# Patient Record
Sex: FEMALE | Race: WHITE | NOT HISPANIC OR LATINO | Employment: OTHER | URBAN - METROPOLITAN AREA
[De-identification: names, ages, dates, MRNs, and addresses within clinical notes are randomized per-mention and may not be internally consistent; named-entity substitution may affect disease eponyms.]

---

## 2019-02-05 ENCOUNTER — HOSPITAL ENCOUNTER (OUTPATIENT)
Dept: LAB | Facility: HOSPITAL | Age: 84
Discharge: HOME | End: 2019-02-05
Attending: INTERNAL MEDICINE

## 2019-02-05 LAB
APPEARANCE UR: ABNORMAL
BILIRUB UR QL: NEGATIVE
COLOR UR: YELLOW
CONV BACTERIA: ABNORMAL
CONV COLLECTION SOURCE (UA): ABNORMAL
CONV HYALINE CASTS IN URINE MICRO: ABNORMAL /[LPF]
CONV UROBILINOGEN IN URINE BY AUTOMATED TEST STRIP: 0.2 {EHRLICHU}/DL (ref 0.1–1)
GLUCOSE UR QL: NEGATIVE MG/DL
HGB UR QL STRIP: ABNORMAL
KETONES UR QL STRIP: NEGATIVE MG/DL
LEUKOCYTE ESTERASE UR QL STRIP: ABNORMAL
NITRITE UR QL STRIP: NEGATIVE
PH UR STRIP.AUTO: 5.5 [PH] (ref 5–8)
PROT UR QL: NEGATIVE MG/DL
RBC #/AREA URNS HPF: ABNORMAL /[HPF]
SP GR UR: 1.02 (ref 1–1.03)
SQUAMOUS SPT QL MICRO: ABNORMAL /[HPF]
WBC #/AREA URNS HPF: ABNORMAL /[HPF]

## 2019-02-07 LAB — BACTERIA UR CULT: NORMAL

## 2019-02-08 ENCOUNTER — HOSPITAL ENCOUNTER (OUTPATIENT)
Dept: OTHER | Facility: HOSPITAL | Age: 84
Discharge: HOME | End: 2019-02-08
Attending: INTERNAL MEDICINE

## 2019-04-24 ENCOUNTER — HOSPITAL ENCOUNTER (OUTPATIENT)
Dept: CARDIOLOGY | Facility: HOSPITAL | Age: 84
Discharge: HOME | End: 2019-04-24

## 2019-05-28 ENCOUNTER — HOSPITAL ENCOUNTER (OUTPATIENT)
Dept: CARDIOLOGY | Facility: HOSPITAL | Age: 84
Discharge: HOME | End: 2019-05-28

## 2019-06-04 ENCOUNTER — HOSPITAL ENCOUNTER (OUTPATIENT)
Dept: LAB | Facility: HOSPITAL | Age: 84
Discharge: HOME | End: 2019-06-04
Attending: INTERNAL MEDICINE

## 2019-06-04 LAB
ALBUMIN SERPL-MCNC: 4 G/DL (ref 3.5–5)
ALBUMIN/GLOB SERPL: 1.3 {RATIO} (ref 1.4–2.6)
ALP SERPL-CCNC: 76 U/L (ref 43–160)
ALT SERPL-CCNC: 13 U/L (ref 10–40)
ANION GAP SERPL CALC-SCNC: 17 MMOL/L (ref 8–19)
AST SERPL-CCNC: 22 U/L (ref 15–50)
BASOPHILS # BLD AUTO: 0.07 10*3/UL (ref 0–0.2)
BASOPHILS NFR BLD AUTO: 0.8 % (ref 0–3)
BILIRUB SERPL-MCNC: 0.45 MG/DL (ref 0.2–1.3)
BUN SERPL-MCNC: 21 MG/DL (ref 5–25)
BUN/CREAT SERPL: 23 {RATIO} (ref 6–20)
CALCIUM SERPL-MCNC: 9.8 MG/DL (ref 8.7–10.4)
CHLORIDE SERPL-SCNC: 99 MMOL/L (ref 99–111)
CHOLEST SERPL-MCNC: 215 MG/DL (ref 107–200)
CHOLEST/HDLC SERPL: 3.8 {RATIO} (ref 3–6)
CONV ABS IMM GRAN: 0.06 10*3/UL (ref 0–0.2)
CONV CO2: 28 MMOL/L (ref 22–32)
CONV IMMATURE GRAN: 0.7 % (ref 0–1.8)
CONV TOTAL PROTEIN: 7.1 G/DL (ref 6.3–8.2)
CREAT UR-MCNC: 0.9 MG/DL (ref 0.5–0.9)
DEPRECATED RDW RBC AUTO: 45.1 FL (ref 36.4–46.3)
EOSINOPHIL # BLD AUTO: 0.18 10*3/UL (ref 0–0.7)
EOSINOPHIL # BLD AUTO: 2.1 % (ref 0–7)
ERYTHROCYTE [DISTWIDTH] IN BLOOD BY AUTOMATED COUNT: 13.1 % (ref 11.7–14.4)
EST. AVERAGE GLUCOSE BLD GHB EST-MCNC: 128 MG/DL
GFR SERPLBLD BASED ON 1.73 SQ M-ARVRAT: 58 ML/MIN/{1.73_M2}
GLOBULIN UR ELPH-MCNC: 3.1 G/DL (ref 2–3.5)
GLUCOSE SERPL-MCNC: 120 MG/DL (ref 65–99)
HBA1C MFR BLD: 14.3 G/DL (ref 12–16)
HBA1C MFR BLD: 6.1 % (ref 3.5–5.7)
HCT VFR BLD AUTO: 44.4 % (ref 37–47)
HDLC SERPL-MCNC: 57 MG/DL (ref 40–60)
LDLC SERPL CALC-MCNC: 142 MG/DL (ref 70–100)
LYMPHOCYTES # BLD AUTO: 2.58 10*3/UL (ref 1–5)
MCH RBC QN AUTO: 30.1 PG (ref 27–31)
MCHC RBC AUTO-ENTMCNC: 32.2 G/DL (ref 33–37)
MCV RBC AUTO: 93.5 FL (ref 81–99)
MONOCYTES # BLD AUTO: 0.7 10*3/UL (ref 0.2–1.2)
MONOCYTES NFR BLD AUTO: 8.2 % (ref 3–10)
NEUTROPHILS # BLD AUTO: 4.97 10*3/UL (ref 2–8)
NEUTROPHILS NFR BLD AUTO: 58.1 % (ref 30–85)
NRBC CBCN: 0 % (ref 0–0.7)
OSMOLALITY SERPL CALC.SUM OF ELEC: 294 MOSM/KG (ref 273–304)
PLATELET # BLD AUTO: 277 10*3/UL (ref 130–400)
PMV BLD AUTO: 10.9 FL (ref 9.4–12.3)
POTASSIUM SERPL-SCNC: 3.9 MMOL/L (ref 3.5–5.3)
RBC # BLD AUTO: 4.75 10*6/UL (ref 4.2–5.4)
SODIUM SERPL-SCNC: 140 MMOL/L (ref 135–147)
T4 FREE SERPL-MCNC: 1.6 NG/DL (ref 0.9–1.8)
TRIGL SERPL-MCNC: 82 MG/DL (ref 40–150)
TSH SERPL-ACNC: 2.95 M[IU]/L (ref 0.27–4.2)
VARIANT LYMPHS NFR BLD MANUAL: 30.1 % (ref 20–45)
VLDLC SERPL-MCNC: 16 MG/DL (ref 5–37)
WBC # BLD AUTO: 8.56 10*3/UL (ref 4.8–10.8)

## 2019-08-12 ENCOUNTER — HOSPITAL ENCOUNTER (OUTPATIENT)
Dept: OTHER | Facility: HOSPITAL | Age: 84
Discharge: HOME | End: 2019-08-12
Attending: PHYSICIAN ASSISTANT

## 2019-08-12 LAB
T4 FREE SERPL-MCNC: 1.4 NG/DL (ref 0.9–1.8)
TSH SERPL-ACNC: 2.04 M[IU]/L (ref 0.27–4.2)

## 2019-08-21 ENCOUNTER — HOSPITAL ENCOUNTER (OUTPATIENT)
Dept: GENERAL RADIOLOGY | Facility: HOSPITAL | Age: 84
Discharge: HOME | End: 2019-08-21
Attending: PHYSICIAN ASSISTANT

## 2019-09-24 ENCOUNTER — HOSPITAL ENCOUNTER (OUTPATIENT)
Dept: CARDIOLOGY | Facility: HOSPITAL | Age: 84
Discharge: HOME | End: 2019-09-24

## 2019-11-05 ENCOUNTER — HOSPITAL ENCOUNTER (OUTPATIENT)
Dept: OTHER | Facility: HOSPITAL | Age: 84
Discharge: HOME | End: 2019-11-05
Attending: INTERNAL MEDICINE

## 2019-11-05 LAB
ALBUMIN SERPL-MCNC: 4.1 G/DL (ref 3.5–5)
ALBUMIN/GLOB SERPL: 1.3 {RATIO} (ref 1.4–2.6)
ALP SERPL-CCNC: 76 U/L (ref 43–160)
ALT SERPL-CCNC: 12 U/L (ref 10–40)
ANION GAP SERPL CALC-SCNC: 18 MMOL/L (ref 8–19)
AST SERPL-CCNC: 27 U/L (ref 15–50)
BASOPHILS # BLD AUTO: 0.08 10*3/UL (ref 0–0.2)
BASOPHILS NFR BLD AUTO: 0.7 % (ref 0–3)
BILIRUB SERPL-MCNC: 0.44 MG/DL (ref 0.2–1.3)
BUN SERPL-MCNC: 21 MG/DL (ref 5–25)
BUN/CREAT SERPL: 23 {RATIO} (ref 6–20)
CALCIUM SERPL-MCNC: 9.8 MG/DL (ref 8.7–10.4)
CHLORIDE SERPL-SCNC: 100 MMOL/L (ref 99–111)
CONV ABS IMM GRAN: 0.05 10*3/UL (ref 0–0.2)
CONV CO2: 26 MMOL/L (ref 22–32)
CONV IMMATURE GRAN: 0.5 % (ref 0–1.8)
CONV TOTAL PROTEIN: 7.2 G/DL (ref 6.3–8.2)
CREAT UR-MCNC: 0.93 MG/DL (ref 0.5–0.9)
DEPRECATED RDW RBC AUTO: 47.3 FL (ref 36.4–46.3)
EOSINOPHIL # BLD AUTO: 0.2 10*3/UL (ref 0–0.7)
EOSINOPHIL # BLD AUTO: 1.9 % (ref 0–7)
ERYTHROCYTE [DISTWIDTH] IN BLOOD BY AUTOMATED COUNT: 13.3 % (ref 11.7–14.4)
EST. AVERAGE GLUCOSE BLD GHB EST-MCNC: 134 MG/DL
GFR SERPLBLD BASED ON 1.73 SQ M-ARVRAT: 55 ML/MIN/{1.73_M2}
GLOBULIN UR ELPH-MCNC: 3.1 G/DL (ref 2–3.5)
GLUCOSE SERPL-MCNC: 119 MG/DL (ref 65–99)
HBA1C MFR BLD: 6.3 % (ref 3.5–5.7)
HCT VFR BLD AUTO: 45.4 % (ref 37–47)
HGB BLD-MCNC: 14.2 G/DL (ref 12–16)
LYMPHOCYTES # BLD AUTO: 2.62 10*3/UL (ref 1–5)
LYMPHOCYTES NFR BLD AUTO: 24.5 % (ref 20–45)
MCH RBC QN AUTO: 30 PG (ref 27–31)
MCHC RBC AUTO-ENTMCNC: 31.3 G/DL (ref 33–37)
MCV RBC AUTO: 96 FL (ref 81–99)
MONOCYTES # BLD AUTO: 0.72 10*3/UL (ref 0.2–1.2)
MONOCYTES NFR BLD AUTO: 6.7 % (ref 3–10)
NEUTROPHILS # BLD AUTO: 7.03 10*3/UL (ref 2–8)
NEUTROPHILS NFR BLD AUTO: 65.7 % (ref 30–85)
NRBC CBCN: 0 % (ref 0–0.7)
OSMOLALITY SERPL CALC.SUM OF ELEC: 294 MOSM/KG (ref 273–304)
PLATELET # BLD AUTO: 265 10*3/UL (ref 130–400)
PMV BLD AUTO: 11.1 FL (ref 9.4–12.3)
POTASSIUM SERPL-SCNC: 3.5 MMOL/L (ref 3.5–5.3)
RBC # BLD AUTO: 4.73 10*6/UL (ref 4.2–5.4)
SODIUM SERPL-SCNC: 140 MMOL/L (ref 135–147)
TSH SERPL-ACNC: 2.45 M[IU]/L (ref 0.27–4.2)
WBC # BLD AUTO: 10.7 10*3/UL (ref 4.8–10.8)

## 2020-02-23 ENCOUNTER — HOSPITAL ENCOUNTER (OUTPATIENT)
Dept: URGENT CARE | Facility: CLINIC | Age: 85
Discharge: HOME | End: 2020-02-23
Attending: FAMILY MEDICINE

## 2020-05-06 ENCOUNTER — HOSPITAL ENCOUNTER (OUTPATIENT)
Dept: LAB | Facility: HOSPITAL | Age: 85
Discharge: HOME | End: 2020-05-06
Attending: INTERNAL MEDICINE

## 2020-05-06 LAB
ALBUMIN SERPL-MCNC: 4.1 G/DL (ref 3.5–5)
ALBUMIN/GLOB SERPL: 1.4 {RATIO} (ref 1.4–2.6)
ALP SERPL-CCNC: 71 U/L (ref 43–160)
ALT SERPL-CCNC: 9 U/L (ref 10–40)
ANION GAP SERPL CALC-SCNC: 19 MMOL/L (ref 8–19)
AST SERPL-CCNC: 21 U/L (ref 15–50)
BASOPHILS # BLD AUTO: 0.08 10*3/UL (ref 0–0.2)
BASOPHILS NFR BLD AUTO: 0.9 % (ref 0–3)
BILIRUB SERPL-MCNC: 0.41 MG/DL (ref 0.2–1.3)
BUN SERPL-MCNC: 18 MG/DL (ref 5–25)
BUN/CREAT SERPL: 20 {RATIO} (ref 6–20)
CALCIUM SERPL-MCNC: 10.1 MG/DL (ref 8.7–10.4)
CHLORIDE SERPL-SCNC: 101 MMOL/L (ref 99–111)
CONV ABS IMM GRAN: 0.04 10*3/UL (ref 0–0.2)
CONV CO2: 26 MMOL/L (ref 22–32)
CONV IMMATURE GRAN: 0.4 % (ref 0–1.8)
CONV TOTAL PROTEIN: 7.1 G/DL (ref 6.3–8.2)
CREAT UR-MCNC: 0.9 MG/DL (ref 0.5–0.9)
DEPRECATED RDW RBC AUTO: 45.3 FL (ref 36.4–46.3)
EOSINOPHIL # BLD AUTO: 0.18 10*3/UL (ref 0–0.7)
EOSINOPHIL # BLD AUTO: 2 % (ref 0–7)
ERYTHROCYTE [DISTWIDTH] IN BLOOD BY AUTOMATED COUNT: 13 % (ref 11.7–14.4)
GFR SERPLBLD BASED ON 1.73 SQ M-ARVRAT: 57 ML/MIN/{1.73_M2}
GLOBULIN UR ELPH-MCNC: 3 G/DL (ref 2–3.5)
GLUCOSE SERPL-MCNC: 124 MG/DL (ref 65–99)
HCT VFR BLD AUTO: 44.5 % (ref 37–47)
HGB BLD-MCNC: 14 G/DL (ref 12–16)
LYMPHOCYTES # BLD AUTO: 2.93 10*3/UL (ref 1–5)
LYMPHOCYTES NFR BLD AUTO: 32.5 % (ref 20–45)
MCH RBC QN AUTO: 29.7 PG (ref 27–31)
MCHC RBC AUTO-ENTMCNC: 31.5 G/DL (ref 33–37)
MCV RBC AUTO: 94.5 FL (ref 81–99)
MONOCYTES # BLD AUTO: 0.68 10*3/UL (ref 0.2–1.2)
MONOCYTES NFR BLD AUTO: 7.5 % (ref 3–10)
NEUTROPHILS # BLD AUTO: 5.11 10*3/UL (ref 2–8)
NEUTROPHILS NFR BLD AUTO: 56.7 % (ref 30–85)
NRBC CBCN: 0 % (ref 0–0.7)
OSMOLALITY SERPL CALC.SUM OF ELEC: 297 MOSM/KG (ref 273–304)
PLATELET # BLD AUTO: 248 10*3/UL (ref 130–400)
PMV BLD AUTO: 11.4 FL (ref 9.4–12.3)
POTASSIUM SERPL-SCNC: 3.9 MMOL/L (ref 3.5–5.3)
RBC # BLD AUTO: 4.71 10*6/UL (ref 4.2–5.4)
SODIUM SERPL-SCNC: 142 MMOL/L (ref 135–147)
TSH SERPL-ACNC: 2.47 M[IU]/L (ref 0.27–4.2)
WBC # BLD AUTO: 9.02 10*3/UL (ref 4.8–10.8)

## 2020-05-13 ENCOUNTER — HOSPITAL ENCOUNTER (OUTPATIENT)
Dept: GENERAL RADIOLOGY | Facility: HOSPITAL | Age: 85
Discharge: HOME | End: 2020-05-13
Attending: INTERNAL MEDICINE

## 2020-05-19 ENCOUNTER — OFFICE VISIT CONVERTED (OUTPATIENT)
Dept: ORTHOPEDIC SURGERY | Facility: CLINIC | Age: 85
End: 2020-05-19
Attending: ORTHOPAEDIC SURGERY

## 2020-08-11 ENCOUNTER — HOSPITAL ENCOUNTER (OUTPATIENT)
Dept: OTHER | Facility: HOSPITAL | Age: 85
Discharge: HOME | End: 2020-08-11
Attending: PHYSICIAN ASSISTANT

## 2020-08-11 LAB
T4 FREE SERPL-MCNC: 1.3 NG/DL (ref 0.9–1.8)
TSH SERPL-ACNC: 3.41 M[IU]/L (ref 0.27–4.2)

## 2020-09-09 ENCOUNTER — HOSPITAL ENCOUNTER (OUTPATIENT)
Dept: OTHER | Facility: HOSPITAL | Age: 85
Discharge: HOME | End: 2020-09-09
Attending: INTERNAL MEDICINE

## 2020-09-09 LAB
25(OH)D3 SERPL-MCNC: 93.8 NG/ML (ref 30–100)
ALBUMIN SERPL-MCNC: 3.9 G/DL (ref 3.5–5)
ALBUMIN/GLOB SERPL: 1.4 {RATIO} (ref 1.4–2.6)
ALP SERPL-CCNC: 69 U/L (ref 43–160)
ALT SERPL-CCNC: 11 U/L (ref 10–40)
ANION GAP SERPL CALC-SCNC: 17 MMOL/L (ref 8–19)
AST SERPL-CCNC: 24 U/L (ref 15–50)
BASOPHILS # BLD AUTO: 0.09 10*3/UL (ref 0–0.2)
BASOPHILS NFR BLD AUTO: 1 % (ref 0–3)
BILIRUB SERPL-MCNC: 0.49 MG/DL (ref 0.2–1.3)
BUN SERPL-MCNC: 23 MG/DL (ref 5–25)
BUN/CREAT SERPL: 27 {RATIO} (ref 6–20)
CALCIUM SERPL-MCNC: 9.8 MG/DL (ref 8.7–10.4)
CHLORIDE SERPL-SCNC: 102 MMOL/L (ref 99–111)
CONV ABS IMM GRAN: 0.05 10*3/UL (ref 0–0.2)
CONV CO2: 27 MMOL/L (ref 22–32)
CONV IMMATURE GRAN: 0.6 % (ref 0–1.8)
CONV TOTAL PROTEIN: 6.7 G/DL (ref 6.3–8.2)
CREAT UR-MCNC: 0.86 MG/DL (ref 0.5–0.9)
DEPRECATED RDW RBC AUTO: 46.2 FL (ref 36.4–46.3)
EOSINOPHIL # BLD AUTO: 0.22 10*3/UL (ref 0–0.7)
EOSINOPHIL # BLD AUTO: 2.5 % (ref 0–7)
ERYTHROCYTE [DISTWIDTH] IN BLOOD BY AUTOMATED COUNT: 13 % (ref 11.7–14.4)
EST. AVERAGE GLUCOSE BLD GHB EST-MCNC: 123 MG/DL
FOLATE SERPL-MCNC: >20 NG/ML (ref 4.8–20)
GFR SERPLBLD BASED ON 1.73 SQ M-ARVRAT: >60 ML/MIN/{1.73_M2}
GLOBULIN UR ELPH-MCNC: 2.8 G/DL (ref 2–3.5)
GLUCOSE SERPL-MCNC: 122 MG/DL (ref 65–99)
HBA1C MFR BLD: 5.9 % (ref 3.5–5.7)
HCT VFR BLD AUTO: 45.4 % (ref 37–47)
HGB BLD-MCNC: 14.2 G/DL (ref 12–16)
LYMPHOCYTES # BLD AUTO: 2.82 10*3/UL (ref 1–5)
LYMPHOCYTES NFR BLD AUTO: 31.8 % (ref 20–45)
MCH RBC QN AUTO: 30.3 PG (ref 27–31)
MCHC RBC AUTO-ENTMCNC: 31.3 G/DL (ref 33–37)
MCV RBC AUTO: 96.8 FL (ref 81–99)
MONOCYTES # BLD AUTO: 0.64 10*3/UL (ref 0.2–1.2)
MONOCYTES NFR BLD AUTO: 7.2 % (ref 3–10)
NEUTROPHILS # BLD AUTO: 5.04 10*3/UL (ref 2–8)
NEUTROPHILS NFR BLD AUTO: 56.9 % (ref 30–85)
NRBC CBCN: 0 % (ref 0–0.7)
OSMOLALITY SERPL CALC.SUM OF ELEC: 299 MOSM/KG (ref 273–304)
PLATELET # BLD AUTO: 252 10*3/UL (ref 130–400)
PMV BLD AUTO: 10.9 FL (ref 9.4–12.3)
POTASSIUM SERPL-SCNC: 3.9 MMOL/L (ref 3.5–5.3)
RBC # BLD AUTO: 4.69 10*6/UL (ref 4.2–5.4)
SODIUM SERPL-SCNC: 142 MMOL/L (ref 135–147)
T4 FREE SERPL-MCNC: 1.4 NG/DL (ref 0.9–1.8)
TSH SERPL-ACNC: 2.42 M[IU]/L (ref 0.27–4.2)
VIT B12 SERPL-MCNC: 639 PG/ML (ref 211–911)
WBC # BLD AUTO: 8.86 10*3/UL (ref 4.8–10.8)

## 2020-09-18 ENCOUNTER — HOSPITAL ENCOUNTER (OUTPATIENT)
Dept: OTHER | Facility: HOSPITAL | Age: 85
Discharge: HOME | End: 2020-09-18
Attending: INTERNAL MEDICINE

## 2020-09-29 ENCOUNTER — HOSPITAL ENCOUNTER (OUTPATIENT)
Dept: OTHER | Facility: HOSPITAL | Age: 85
Discharge: HOME | End: 2020-09-29
Attending: PHYSICIAN ASSISTANT

## 2020-09-29 LAB
T4 FREE SERPL-MCNC: 1.2 NG/DL (ref 0.9–1.8)
TSH SERPL-ACNC: 2.13 M[IU]/L (ref 0.27–4.2)

## 2020-10-20 ENCOUNTER — HOSPITAL ENCOUNTER (OUTPATIENT)
Dept: CARDIOLOGY | Facility: HOSPITAL | Age: 85
Discharge: HOME | End: 2020-10-20
Attending: SURGERY

## 2021-02-26 ENCOUNTER — HOSPITAL ENCOUNTER (OUTPATIENT)
Dept: VACCINE CLINIC | Facility: HOSPITAL | Age: 86
Discharge: HOME | End: 2021-02-26
Attending: INTERNAL MEDICINE

## 2021-03-11 ENCOUNTER — HOSPITAL ENCOUNTER (OUTPATIENT)
Dept: OTHER | Facility: HOSPITAL | Age: 86
Discharge: HOME | End: 2021-03-11
Attending: INTERNAL MEDICINE

## 2021-03-11 LAB
ALBUMIN SERPL-MCNC: 3.7 G/DL (ref 3.5–5)
ALBUMIN/GLOB SERPL: 1 {RATIO} (ref 1.4–2.6)
ALP SERPL-CCNC: 86 U/L (ref 43–160)
ALT SERPL-CCNC: 16 U/L (ref 10–40)
ANION GAP SERPL CALC-SCNC: 16 MMOL/L (ref 8–19)
AST SERPL-CCNC: 25 U/L (ref 15–50)
BASOPHILS # BLD AUTO: 0.08 10*3/UL (ref 0–0.2)
BASOPHILS NFR BLD AUTO: 0.7 % (ref 0–3)
BILIRUB SERPL-MCNC: 0.44 MG/DL (ref 0.2–1.3)
BUN SERPL-MCNC: 17 MG/DL (ref 5–25)
BUN/CREAT SERPL: 17 {RATIO} (ref 6–20)
CALCIUM SERPL-MCNC: 9.9 MG/DL (ref 8.7–10.4)
CHLORIDE SERPL-SCNC: 100 MMOL/L (ref 99–111)
CONV ABS IMM GRAN: 0.16 10*3/UL (ref 0–0.2)
CONV CO2: 27 MMOL/L (ref 22–32)
CONV CREATININE URINE, RANDOM: 66.1 MG/DL (ref 10–300)
CONV IMMATURE GRAN: 1.3 % (ref 0–1.8)
CONV MICROALBUM.,U,RANDOM: 12.1 MG/L (ref 0–20)
CONV TOTAL PROTEIN: 7.4 G/DL (ref 6.3–8.2)
CREAT UR-MCNC: 1.02 MG/DL (ref 0.5–0.9)
DEPRECATED RDW RBC AUTO: 44.9 FL (ref 36.4–46.3)
EOSINOPHIL # BLD AUTO: 0.21 10*3/UL (ref 0–0.7)
EOSINOPHIL # BLD AUTO: 1.8 % (ref 0–7)
ERYTHROCYTE [DISTWIDTH] IN BLOOD BY AUTOMATED COUNT: 12.7 % (ref 11.7–14.4)
EST. AVERAGE GLUCOSE BLD GHB EST-MCNC: 134 MG/DL
GFR SERPLBLD BASED ON 1.73 SQ M-ARVRAT: 49 ML/MIN/{1.73_M2}
GLOBULIN UR ELPH-MCNC: 3.7 G/DL (ref 2–3.5)
GLUCOSE SERPL-MCNC: 116 MG/DL (ref 65–99)
HBA1C MFR BLD: 6.3 % (ref 3.5–5.7)
HCT VFR BLD AUTO: 44.1 % (ref 37–47)
HGB BLD-MCNC: 13.8 G/DL (ref 12–16)
LYMPHOCYTES # BLD AUTO: 2.97 10*3/UL (ref 1–5)
LYMPHOCYTES NFR BLD AUTO: 24.8 % (ref 20–45)
MCH RBC QN AUTO: 29.9 PG (ref 27–31)
MCHC RBC AUTO-ENTMCNC: 31.3 G/DL (ref 33–37)
MCV RBC AUTO: 95.7 FL (ref 81–99)
MICROALBUMIN/CREAT UR: 18.3 MG/G{CRE} (ref 0–35)
MONOCYTES # BLD AUTO: 0.95 10*3/UL (ref 0.2–1.2)
MONOCYTES NFR BLD AUTO: 7.9 % (ref 3–10)
NEUTROPHILS # BLD AUTO: 7.61 10*3/UL (ref 2–8)
NEUTROPHILS NFR BLD AUTO: 63.5 % (ref 30–85)
NRBC CBCN: 0 % (ref 0–0.7)
OSMOLALITY SERPL CALC.SUM OF ELEC: 291 MOSM/KG (ref 273–304)
PLATELET # BLD AUTO: 341 10*3/UL (ref 130–400)
PMV BLD AUTO: 10.4 FL (ref 9.4–12.3)
POTASSIUM SERPL-SCNC: 3.9 MMOL/L (ref 3.5–5.3)
RBC # BLD AUTO: 4.61 10*6/UL (ref 4.2–5.4)
SODIUM SERPL-SCNC: 139 MMOL/L (ref 135–147)
WBC # BLD AUTO: 11.98 10*3/UL (ref 4.8–10.8)

## 2021-03-26 ENCOUNTER — HOSPITAL ENCOUNTER (OUTPATIENT)
Dept: VACCINE CLINIC | Facility: HOSPITAL | Age: 86
Discharge: HOME | End: 2021-03-26
Attending: INTERNAL MEDICINE

## 2021-04-01 ENCOUNTER — HOSPITAL ENCOUNTER (OUTPATIENT)
Dept: CARDIOLOGY | Facility: HOSPITAL | Age: 86
Discharge: HOME | End: 2021-04-01
Attending: SURGERY

## 2021-05-10 NOTE — H&P
"   History and Physical      Patient Name: Sugar Lee   Patient ID: [id number]   Sex: Female   YOB: 1932        Visit Date: May 19, 2020    Provider: Jorje Boyer MD   Location: [facility]   Location Address: [address] [place] [address]  [id number]   Location Phone: [phone]          History Of Present Illness  Sugar Lee is a 87 year old female who presents today to [facility]. Patient presents for evaluation of left wrist pain/left thumb pain. Patient states her left wrist/thumb have been causing her pain for the past two months. Patient points to pain in the base of her thumb and radial wrist. Patient has been using arthritis cream/CBD oil with some relief. Patient denies numbness/tingling. Patient PCP ordered xrays and patient has been using brace she has at home.       Allergy List  SULFA (SULFONAMIDES)       Allergies Reconciled  Social History  Tobacco         Review of Systems  · Constitutional  o Denies  o : fever, chills, weight loss  · Cardiovascular  o Denies  o : chest pain, shortness of breath  · Gastrointestinal  o Denies  o : liver disease, heartburn, nausea, blood in stools  · Genitourinary  o Denies  o : painful urination, blood in urine  · Integument  o Denies  o : rash, itching  · Neurologic  o Denies  o : headache, weakness, loss of consciousness  · Musculoskeletal  o Denies  o : painful, swollen joints  · Psychiatric  o Denies  o : drug/alcohol addiction, anxiety, depression      Vitals  Date Time BP Position Site L\R Cuff Size HR RR TEMP (F) WT  HT  BMI kg/m2 BSA m2 O2 Sat        05/19/2020 01:47 PM          5'  4\"             Physical Examination  · Constitutional  o Appearance  o : well developed, well-nourished, no obvious deformities present  · Head and Face  o Head  o :   § Inspection  § : normocephalic  o Face  o :   § Inspection  § : no facial lesions  · Eyes  o Conjunctivae  o : conjunctivae normal  o Sclerae  o : sclerae " white  · Ears, Nose, Mouth and Throat  o Ears  o :   § External Ears  § : appearance within normal limits  § Hearing  § : intact  o Nose  o :   § External Nose  § : appearance normal  · Neck  o Inspection/Palpation  o : normal appearance  o Range of Motion  o : full range of motion  · Respiratory  o Respiratory Effort  o : breathing unlabored  o Inspection of Chest  o : normal appearance  o Auscultation of Lungs  o : no audible wheezing or rales  · Cardiovascular  o Heart  o : regular rate  · Gastrointestinal  o Abdominal Examination  o : soft and non-tender  · Skin and Subcutaneous Tissue  o General Inspection  o : intact, no rashes  · Psychiatric  o General  o : Alert and oriented x3  o Judgement and Insight  o : judgment and insight intact  o Mood and Affect  o : mood normal, affect appropriate  · Left Wrist  o Inspection  o : Tender to palpation of radial wrist, full range of motion. Neurovascularly intact.   · Left Hand  o Inspection  o : capillary refill less than 5 seconds in all five nailbeds, skin intact, no redness, no swelling, no ecchymosis. Tender to palpation at base of thumb, positive grind test. Full range of motion with pain in the thumb.   · Injection Note/Aspiration Note  o Site  o : left wrist  o Procedure  o : Procedure: After educating the patient, patient gave consent for procedure. After using Chloraprep, the joint space was injected. The patient tolerated the procedure well.  o Medication  o : 80 mg of DepoMedrol with 1cc of 1% Lidocaine  · Imaging  o Imaging  o : Xray left wrist, 5/13/20, CONCLUSION: 1. Questionable subacute fracture of the triquetrum bone.           Assessment  · Left wrist pain     719.43/M25.532  · Left Thumb CMC arthritis     716.94/M19.049      Plan  · Orders  o Depo-Medrol injection 80mg () - - 05/19/2020   Lot [id number] Exp 05 2021 [employer] Administered by GILDARDO CASTELLANOS MD  o Arthrocentesis of wrist (20605) - - 05/19/2020   Lot [id number] Exp 07  01 2021 [employer] Administered by GILDARDO CASTELLANOS MD  · Medications  o Medications have been Reconciled  o Transition of Care or Provider Policy  · Instructions  o Reviewed the patient's Past Medical, Social, and Family history as well as the ROS at today's visit, no changes.  o Call or return if worsening symptoms.  o Follow up in 6-8 weeks.  o Reviewed xrays with patient, advised her and her daughter of diagnosis and treatment plan including steroid injection today of the left CMC joint and she tolerated it well. Thumb brace given. Follow up in 6-8 weeks.             Electronically Signed by: RENEE Trevino-MISTY -Author on May 19, 2020 02:26:23 PM  Electronically Co-signed by: Gildardo Castellanos MD -Reviewer on May 19, 2020 08:57:14 PM

## 2021-05-15 VITALS — HEIGHT: 64 IN

## 2021-07-06 PROCEDURE — U0005 INFEC AGEN DETEC AMPLI PROBE: HCPCS | Performed by: FAMILY MEDICINE

## 2021-07-06 PROCEDURE — U0003 INFECTIOUS AGENT DETECTION BY NUCLEIC ACID (DNA OR RNA); SEVERE ACUTE RESPIRATORY SYNDROME CORONAVIRUS 2 (SARS-COV-2) (CORONAVIRUS DISEASE [COVID-19]), AMPLIFIED PROBE TECHNIQUE, MAKING USE OF HIGH THROUGHPUT TECHNOLOGIES AS DESCRIBED BY CMS-2020-01-R: HCPCS | Performed by: FAMILY MEDICINE

## 2021-07-21 RX ORDER — SPIRONOLACTONE AND HYDROCHLOROTHIAZIDE 25; 25 MG/1; MG/1
TABLET ORAL
Qty: 45 TABLET | Refills: 4 | Status: SHIPPED | OUTPATIENT
Start: 2021-07-21 | End: 2022-07-26

## 2021-10-01 ENCOUNTER — TRANSCRIBE ORDERS (OUTPATIENT)
Dept: ADMINISTRATIVE | Facility: HOSPITAL | Age: 86
End: 2021-10-01

## 2021-10-01 ENCOUNTER — LAB (OUTPATIENT)
Dept: LAB | Facility: HOSPITAL | Age: 86
End: 2021-10-01

## 2021-10-01 DIAGNOSIS — E89.0 HYPOTHYROIDISM, POSTSURGICAL: ICD-10-CM

## 2021-10-01 DIAGNOSIS — E89.0 HYPOTHYROIDISM, POSTSURGICAL: Primary | ICD-10-CM

## 2021-10-01 LAB
T4 FREE SERPL-MCNC: 1.41 NG/DL (ref 0.93–1.7)
TSH SERPL DL<=0.05 MIU/L-ACNC: 3.04 UIU/ML (ref 0.27–4.2)

## 2021-10-01 PROCEDURE — 84443 ASSAY THYROID STIM HORMONE: CPT

## 2021-10-01 PROCEDURE — 36415 COLL VENOUS BLD VENIPUNCTURE: CPT

## 2021-10-01 PROCEDURE — 84439 ASSAY OF FREE THYROXINE: CPT

## 2021-11-09 ENCOUNTER — LAB (OUTPATIENT)
Dept: LAB | Facility: HOSPITAL | Age: 86
End: 2021-11-09

## 2021-11-09 ENCOUNTER — TRANSCRIBE ORDERS (OUTPATIENT)
Dept: INTERNAL MEDICINE | Facility: CLINIC | Age: 86
End: 2021-11-09

## 2021-11-09 DIAGNOSIS — I10 ESSENTIAL HYPERTENSION, MALIGNANT: ICD-10-CM

## 2021-11-09 DIAGNOSIS — E03.9 MYXEDEMA HEART DISEASE: ICD-10-CM

## 2021-11-09 DIAGNOSIS — I51.9 MYXEDEMA HEART DISEASE: ICD-10-CM

## 2021-11-09 DIAGNOSIS — R73.01 IMPAIRED FASTING GLUCOSE: ICD-10-CM

## 2021-11-09 DIAGNOSIS — E78.5 HYPERLIPIDEMIA, UNSPECIFIED HYPERLIPIDEMIA TYPE: ICD-10-CM

## 2021-11-09 DIAGNOSIS — I10 ESSENTIAL HYPERTENSION, MALIGNANT: Primary | ICD-10-CM

## 2021-11-09 LAB
ALBUMIN SERPL-MCNC: 4.2 G/DL (ref 3.5–5.2)
ALBUMIN/GLOB SERPL: 1.4 G/DL
ALP SERPL-CCNC: 92 U/L (ref 39–117)
ALT SERPL W P-5'-P-CCNC: 13 U/L (ref 1–33)
ANION GAP SERPL CALCULATED.3IONS-SCNC: 12.4 MMOL/L (ref 5–15)
AST SERPL-CCNC: 21 U/L (ref 1–32)
BASOPHILS # BLD AUTO: 0.1 10*3/MM3 (ref 0–0.2)
BASOPHILS NFR BLD AUTO: 1 % (ref 0–1.5)
BILIRUB SERPL-MCNC: 0.4 MG/DL (ref 0–1.2)
BUN SERPL-MCNC: 18 MG/DL (ref 8–23)
BUN/CREAT SERPL: 21.4 (ref 7–25)
CALCIUM SPEC-SCNC: 9.8 MG/DL (ref 8.6–10.5)
CHLORIDE SERPL-SCNC: 102 MMOL/L (ref 98–107)
CHOLEST SERPL-MCNC: 167 MG/DL (ref 0–200)
CO2 SERPL-SCNC: 29.6 MMOL/L (ref 22–29)
CREAT SERPL-MCNC: 0.84 MG/DL (ref 0.57–1)
DEPRECATED RDW RBC AUTO: 38.8 FL (ref 37–54)
EOSINOPHIL # BLD AUTO: 0.23 10*3/MM3 (ref 0–0.4)
EOSINOPHIL NFR BLD AUTO: 2.3 % (ref 0.3–6.2)
ERYTHROCYTE [DISTWIDTH] IN BLOOD BY AUTOMATED COUNT: 11.9 % (ref 12.3–15.4)
GFR SERPL CREATININE-BSD FRML MDRD: 64 ML/MIN/1.73
GLOBULIN UR ELPH-MCNC: 2.9 GM/DL
GLUCOSE SERPL-MCNC: 111 MG/DL (ref 65–99)
HBA1C MFR BLD: 6.52 % (ref 4.8–5.6)
HCT VFR BLD AUTO: 40.5 % (ref 34–46.6)
HDLC SERPL-MCNC: 53 MG/DL (ref 40–60)
HGB BLD-MCNC: 13.8 G/DL (ref 12–15.9)
IMM GRANULOCYTES # BLD AUTO: 0.06 10*3/MM3 (ref 0–0.05)
IMM GRANULOCYTES NFR BLD AUTO: 0.6 % (ref 0–0.5)
LDLC SERPL CALC-MCNC: 97 MG/DL (ref 0–100)
LDLC/HDLC SERPL: 1.81 {RATIO}
LYMPHOCYTES # BLD AUTO: 3.26 10*3/MM3 (ref 0.7–3.1)
LYMPHOCYTES NFR BLD AUTO: 32.8 % (ref 19.6–45.3)
MCH RBC QN AUTO: 30.3 PG (ref 26.6–33)
MCHC RBC AUTO-ENTMCNC: 34.1 G/DL (ref 31.5–35.7)
MCV RBC AUTO: 88.8 FL (ref 79–97)
MONOCYTES # BLD AUTO: 0.82 10*3/MM3 (ref 0.1–0.9)
MONOCYTES NFR BLD AUTO: 8.2 % (ref 5–12)
NEUTROPHILS NFR BLD AUTO: 5.47 10*3/MM3 (ref 1.7–7)
NEUTROPHILS NFR BLD AUTO: 55.1 % (ref 42.7–76)
NRBC BLD AUTO-RTO: 0 /100 WBC (ref 0–0.2)
PLATELET # BLD AUTO: 398 10*3/MM3 (ref 140–450)
PMV BLD AUTO: 10.1 FL (ref 6–12)
POTASSIUM SERPL-SCNC: 3.8 MMOL/L (ref 3.5–5.2)
PROT SERPL-MCNC: 7.1 G/DL (ref 6–8.5)
RBC # BLD AUTO: 4.56 10*6/MM3 (ref 3.77–5.28)
SODIUM SERPL-SCNC: 144 MMOL/L (ref 136–145)
T4 FREE SERPL-MCNC: 1.51 NG/DL (ref 0.93–1.7)
TRIGL SERPL-MCNC: 90 MG/DL (ref 0–150)
TSH SERPL DL<=0.05 MIU/L-ACNC: 4.22 UIU/ML (ref 0.27–4.2)
VLDLC SERPL-MCNC: 17 MG/DL (ref 5–40)
WBC # BLD AUTO: 9.94 10*3/MM3 (ref 3.4–10.8)

## 2021-11-09 PROCEDURE — 80053 COMPREHEN METABOLIC PANEL: CPT

## 2021-11-09 PROCEDURE — 84443 ASSAY THYROID STIM HORMONE: CPT

## 2021-11-09 PROCEDURE — 80061 LIPID PANEL: CPT

## 2021-11-09 PROCEDURE — 36415 COLL VENOUS BLD VENIPUNCTURE: CPT

## 2021-11-09 PROCEDURE — 83036 HEMOGLOBIN GLYCOSYLATED A1C: CPT

## 2021-11-09 PROCEDURE — 85025 COMPLETE CBC W/AUTO DIFF WBC: CPT

## 2021-11-09 PROCEDURE — 84439 ASSAY OF FREE THYROXINE: CPT

## 2021-11-16 ENCOUNTER — OFFICE VISIT (OUTPATIENT)
Dept: INTERNAL MEDICINE | Facility: CLINIC | Age: 86
End: 2021-11-16

## 2021-11-16 VITALS
WEIGHT: 116 LBS | HEART RATE: 70 BPM | HEIGHT: 64 IN | SYSTOLIC BLOOD PRESSURE: 171 MMHG | BODY MASS INDEX: 19.81 KG/M2 | OXYGEN SATURATION: 94 % | TEMPERATURE: 97.2 F | DIASTOLIC BLOOD PRESSURE: 78 MMHG

## 2021-11-16 DIAGNOSIS — F41.1 ANXIETY, GENERALIZED: ICD-10-CM

## 2021-11-16 DIAGNOSIS — I10 HYPERTENSION, ESSENTIAL: ICD-10-CM

## 2021-11-16 DIAGNOSIS — I63.312 CEREBROVASCULAR ACCIDENT (CVA) DUE TO THROMBOSIS OF LEFT MIDDLE CEREBRAL ARTERY (HCC): ICD-10-CM

## 2021-11-16 DIAGNOSIS — M54.2 ACUTE NECK PAIN: ICD-10-CM

## 2021-11-16 DIAGNOSIS — J43.2 CENTRILOBULAR EMPHYSEMA (HCC): ICD-10-CM

## 2021-11-16 DIAGNOSIS — Z23 FLU VACCINE NEED: Primary | ICD-10-CM

## 2021-11-16 DIAGNOSIS — F33.1 MAJOR DEPRESSIVE DISORDER, RECURRENT, MODERATE (HCC): ICD-10-CM

## 2021-11-16 DIAGNOSIS — E13.9 DIABETES 1.5, MANAGED AS TYPE 2 (HCC): ICD-10-CM

## 2021-11-16 PROCEDURE — 90662 IIV NO PRSV INCREASED AG IM: CPT | Performed by: INTERNAL MEDICINE

## 2021-11-16 PROCEDURE — G0008 ADMIN INFLUENZA VIRUS VAC: HCPCS | Performed by: INTERNAL MEDICINE

## 2021-11-16 PROCEDURE — 99214 OFFICE O/P EST MOD 30 MIN: CPT | Performed by: INTERNAL MEDICINE

## 2021-11-16 RX ORDER — TIZANIDINE HYDROCHLORIDE 2 MG/1
2 CAPSULE, GELATIN COATED ORAL NIGHTLY PRN
Qty: 30 CAPSULE | Refills: 0 | Status: SHIPPED | OUTPATIENT
Start: 2021-11-16

## 2021-11-16 NOTE — PROGRESS NOTES
"Chief Complaint/ HPI: f/u with labs and dm and copd   No chief complaint on file.  Blood pressure issues, --- would like flu shot      Objective   Vital Signs  Vitals:    11/16/21 1506   BP: 171/78   Pulse: 70   Temp: 97.2 °F (36.2 °C)   SpO2: 94%   Weight: 52.6 kg (116 lb)   Height: 162.6 cm (64.02\")      Body mass index is 19.9 kg/m².  Review of Systems   Constitutional: Negative.    HENT: Negative.    Eyes: Negative.    Respiratory: Negative.    Cardiovascular: Negative.    Gastrointestinal: Negative.    Endocrine: Negative.    Genitourinary: Negative.    Musculoskeletal: Negative.    Allergic/Immunologic: Negative.    Neurological: Negative.    Hematological: Negative.    Psychiatric/Behavioral: Negative.       Physical Exam  Constitutional:       General: She is not in acute distress.     Appearance: Normal appearance.   HENT:      Head: Normocephalic.      Mouth/Throat:      Mouth: Mucous membranes are moist.   Eyes:      Conjunctiva/sclera: Conjunctivae normal.      Pupils: Pupils are equal, round, and reactive to light.   Cardiovascular:      Rate and Rhythm: Normal rate and regular rhythm.      Pulses: Normal pulses.      Heart sounds: Normal heart sounds.   Pulmonary:      Effort: Pulmonary effort is normal.      Breath sounds: Rhonchi present.   Abdominal:      General: Abdomen is flat. Bowel sounds are normal.      Palpations: Abdomen is soft.   Musculoskeletal:         General: No swelling. Normal range of motion.      Cervical back: Neck supple.   Skin:     General: Skin is warm and dry.      Coloration: Skin is not jaundiced.   Neurological:      General: No focal deficit present.      Mental Status: She is alert and oriented to person, place, and time. Mental status is at baseline.   Psychiatric:         Mood and Affect: Mood normal.         Behavior: Behavior normal.         Thought Content: Thought content normal.         Judgment: Judgment normal.     Decreased breath sounds,  Abdomen was soft no " pulsatile masses felt, lungs showed some rhonchi throughout the bases some inspiratory crackles coarse, bilateral  Result Review :   No results found for: PROBNP, BNP  CMP    CMP 3/11/21 11/9/21   Glucose 116 (A) 111 (A)   BUN 17 18   Creatinine 1.02 (A) 0.84   eGFR Non African Am  64   Sodium 139 144   Potassium 3.9 3.8   Chloride 100 102   Calcium 9.9 9.8   Albumin 3.7 4.20   Total Bilirubin 0.44 0.4   Alkaline Phosphatase 86 92   AST (SGOT) 25 21   ALT (SGPT) 16 13   (A) Abnormal value       Comments are available for some flowsheets but are not being displayed.           CBC w/diff    CBC w/Diff 3/11/21 11/9/21   WBC 11.98 (A) 9.94   RBC 4.61 4.56   Hemoglobin 13.8 13.8   Hematocrit 44.1 40.5   MCV 95.7 88.8   MCH 29.9 30.3   MCHC 31.3 (A) 34.1   RDW 12.7 11.9 (A)   Platelets 341 398   Neutrophil Rel % 63.5 55.1   Immature Granulocyte Rel %  0.6 (A)   Lymphocyte Rel % 24.8 32.8   Monocyte Rel % 7.9 8.2   Eosinophil Rel % 1.8 2.3   Basophil Rel % 0.7 1.0   (A) Abnormal value             Lipid Panel    Lipid Panel 11/9/21   Total Cholesterol 167   Triglycerides 90   HDL Cholesterol 53   VLDL Cholesterol 17   LDL Cholesterol  97   LDL/HDL Ratio 1.81            Lab Results   Component Value Date    TSH 4.220 (H) 11/09/2021    TSH 3.040 10/01/2021    TSH 2.130 09/29/2020      Lab Results   Component Value Date    FREET4 1.51 11/09/2021    FREET4 1.41 10/01/2021    FREET4 1.2 09/29/2020      A1C Last 3 Results    HGBA1C Last 3 Results 3/11/21 11/9/21   Hemoglobin A1C 6.3 (A) 6.52 (A)   (A) Abnormal value       Comments are available for some flowsheets but are not being displayed.                             ICD-10-CM ICD-9-CM   1. Centrilobular emphysema (HCC)  J43.2 492.8   2. Hypertension, essential  I10 401.9   3. Major depressive disorder, recurrent, moderate (HCC)  F33.1 296.32   4. Anxiety, generalized  F41.1 300.02   5. Diabetes 1.5, managed as type 2 (HCC)  E13.9 250.00   6. Cerebrovascular accident (CVA) due  to thrombosis of left middle cerebral artery (HCC)  I63.312 434.01   7. Acute neck pain  M54.2 723.1       Assessment and Plan    Diagnoses and all orders for this visit:    1. Centrilobular emphysema (HCC) (Primary)  -     Hemoglobin A1c; Future  -     Comprehensive Metabolic Panel; Future  -     CBC & Differential; Future  -     Lipid Panel; Future  -     TSH+Free T4; Future    2. Hypertension, essential  -     Hemoglobin A1c; Future  -     Comprehensive Metabolic Panel; Future  -     CBC & Differential; Future  -     Lipid Panel; Future  -     TSH+Free T4; Future    3. Major depressive disorder, recurrent, moderate (HCC)  -     Hemoglobin A1c; Future  -     Comprehensive Metabolic Panel; Future  -     CBC & Differential; Future  -     Lipid Panel; Future  -     TSH+Free T4; Future    4. Anxiety, generalized  -     Hemoglobin A1c; Future  -     Comprehensive Metabolic Panel; Future  -     CBC & Differential; Future  -     Lipid Panel; Future  -     TSH+Free T4; Future    5. Diabetes 1.5, managed as type 2 (HCC)  -     Hemoglobin A1c; Future  -     Comprehensive Metabolic Panel; Future  -     CBC & Differential; Future  -     Lipid Panel; Future  -     TSH+Free T4; Future    6. Cerebrovascular accident (CVA) due to thrombosis of left middle cerebral artery (HCC)  -     Hemoglobin A1c; Future  -     Comprehensive Metabolic Panel; Future  -     CBC & Differential; Future  -     Lipid Panel; Future  -     TSH+Free T4; Future    7. Acute neck pain  -     Hemoglobin A1c; Future  -     Comprehensive Metabolic Panel; Future  -     CBC & Differential; Future  -     Lipid Panel; Future  -     TSH+Free T4; Future    Other orders  -     TiZANidine (Zanaflex) 2 MG capsule; Take 1 capsule by mouth At Night As Needed for Muscle Spasms.  Dispense: 30 capsule; Refill: 0             Claudication lower legs, she does have severe arterial insufficiency left greater than right, has followed up with vascular surgery in the past,,  continues rosuvastatin,, Pletal    Anxiety depression, continues mirtazapine, 7.5 mg nightly    Hearing deficit, uses hearing aids    Left brain stroke left thalamus left internal capsule May 2014 recommended baby aspirin daily,    Hypertension continues Aldactazide 25/25 mg half tablet daily    COPD, discussed quitting tobacco, November 2021,    Hypothyroid previous left thyroidectomy, continues on Synthroid 0.05 mg daily    Osteopenia remotely diagnosed,    Diabetes type 2 hemoglobin A1c previously 6.5, not taking any medications currently,    Cervical dystonia, pain, took muscle relaxers nov 2021 will refill Zanaflex 2 mg nightly as needed    Follow Up   No follow-ups on file.  Patient was given instructions and counseling regarding her condition or for health maintenance advice. Please see specific information pulled into the AVS if appropriate.

## 2021-12-07 DIAGNOSIS — E11.9 TYPE 2 DIABETES MELLITUS WITHOUT COMPLICATIONS (HCC): ICD-10-CM

## 2021-12-07 RX ORDER — MIRTAZAPINE 7.5 MG/1
TABLET, FILM COATED ORAL
Qty: 30 TABLET | Refills: 4 | Status: SHIPPED | OUTPATIENT
Start: 2021-12-07 | End: 2022-07-05

## 2022-01-13 RX ORDER — CILOSTAZOL 100 MG/1
TABLET ORAL
Qty: 60 TABLET | Refills: 5 | Status: SHIPPED | OUTPATIENT
Start: 2022-01-13 | End: 2022-07-22

## 2022-04-27 ENCOUNTER — HOSPITAL ENCOUNTER (EMERGENCY)
Facility: HOSPITAL | Age: 87
Discharge: HOME | End: 2022-04-28
Attending: EMERGENCY MEDICINE | Admitting: EMERGENCY MEDICINE

## 2022-04-27 DIAGNOSIS — S13.9XXA CERVICAL SPRAIN, INITIAL ENCOUNTER: ICD-10-CM
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
## 2022-04-27 DIAGNOSIS — S51.801A AVULSION OF SKIN OF FOREARM, RIGHT, INITIAL ENCOUNTER: ICD-10-CM

## 2022-04-27 PROCEDURE — 99283 EMERGENCY DEPT VISIT LOW MDM: CPT

## 2022-04-28 ENCOUNTER — APPOINTMENT (OUTPATIENT)
Dept: CT IMAGING | Facility: HOSPITAL | Age: 87
End: 2022-04-28

## 2022-04-28 ENCOUNTER — APPOINTMENT (OUTPATIENT)
Dept: GENERAL RADIOLOGY | Facility: HOSPITAL | Age: 87
End: 2022-04-28

## 2022-04-28 VITALS
OXYGEN SATURATION: 96 % | DIASTOLIC BLOOD PRESSURE: 97 MMHG | HEIGHT: 63 IN | HEART RATE: 69 BPM | SYSTOLIC BLOOD PRESSURE: 145 MMHG | WEIGHT: 112.21 LBS | TEMPERATURE: 98.9 F | BODY MASS INDEX: 19.88 KG/M2 | RESPIRATION RATE: 18 BRPM

## 2022-04-28 PROCEDURE — 73090 X-RAY EXAM OF FOREARM: CPT

## 2022-04-28 PROCEDURE — 70450 CT HEAD/BRAIN W/O DYE: CPT

## 2022-04-28 PROCEDURE — 72125 CT NECK SPINE W/O DYE: CPT

## 2022-04-28 RX ORDER — ACETAMINOPHEN 325 MG/1
650 TABLET ORAL ONCE
Status: COMPLETED | OUTPATIENT
Start: 2022-04-28 | End: 2022-04-28

## 2022-04-28 RX ADMIN — ACETAMINOPHEN 650 MG: 325 TABLET ORAL at 02:10

## 2022-05-11 ENCOUNTER — LAB (OUTPATIENT)
Dept: LAB | Facility: HOSPITAL | Age: 87
End: 2022-05-11

## 2022-05-11 DIAGNOSIS — F41.1 ANXIETY, GENERALIZED: ICD-10-CM

## 2022-05-11 DIAGNOSIS — E13.9 DIABETES 1.5, MANAGED AS TYPE 2: ICD-10-CM

## 2022-05-11 DIAGNOSIS — I10 HYPERTENSION, ESSENTIAL: ICD-10-CM

## 2022-05-11 DIAGNOSIS — I63.312 CEREBROVASCULAR ACCIDENT (CVA) DUE TO THROMBOSIS OF LEFT MIDDLE CEREBRAL ARTERY: ICD-10-CM

## 2022-05-11 DIAGNOSIS — J43.2 CENTRILOBULAR EMPHYSEMA: ICD-10-CM

## 2022-05-11 DIAGNOSIS — F33.1 MAJOR DEPRESSIVE DISORDER, RECURRENT, MODERATE: ICD-10-CM

## 2022-05-11 DIAGNOSIS — M54.2 ACUTE NECK PAIN: ICD-10-CM

## 2022-05-12 ENCOUNTER — LAB (OUTPATIENT)
Dept: LAB | Facility: HOSPITAL | Age: 87
End: 2022-05-12

## 2022-05-12 DIAGNOSIS — F33.1 MAJOR DEPRESSIVE DISORDER, RECURRENT, MODERATE: ICD-10-CM

## 2022-05-12 DIAGNOSIS — J43.2 CENTRILOBULAR EMPHYSEMA: ICD-10-CM

## 2022-05-12 DIAGNOSIS — M54.2 ACUTE NECK PAIN: ICD-10-CM

## 2022-05-12 DIAGNOSIS — I63.312 CEREBROVASCULAR ACCIDENT (CVA) DUE TO THROMBOSIS OF LEFT MIDDLE CEREBRAL ARTERY: ICD-10-CM

## 2022-05-12 DIAGNOSIS — E13.9 DIABETES 1.5, MANAGED AS TYPE 2: ICD-10-CM

## 2022-05-12 DIAGNOSIS — I10 HYPERTENSION, ESSENTIAL: ICD-10-CM

## 2022-05-12 DIAGNOSIS — F41.1 ANXIETY, GENERALIZED: ICD-10-CM

## 2022-05-12 LAB
ALBUMIN SERPL-MCNC: 4 G/DL (ref 3.5–5.2)
ALBUMIN/GLOB SERPL: 1.5 G/DL
ALP SERPL-CCNC: 97 U/L (ref 39–117)
ALT SERPL W P-5'-P-CCNC: 7 U/L (ref 1–33)
ANION GAP SERPL CALCULATED.3IONS-SCNC: 13.7 MMOL/L (ref 5–15)
AST SERPL-CCNC: 27 U/L (ref 1–32)
BASOPHILS # BLD AUTO: 0.08 10*3/MM3 (ref 0–0.2)
BASOPHILS NFR BLD AUTO: 0.9 % (ref 0–1.5)
BILIRUB SERPL-MCNC: 0.5 MG/DL (ref 0–1.2)
BUN SERPL-MCNC: 19 MG/DL (ref 8–23)
BUN/CREAT SERPL: 21.3 (ref 7–25)
CALCIUM SPEC-SCNC: 9.5 MG/DL (ref 8.6–10.5)
CHLORIDE SERPL-SCNC: 102 MMOL/L (ref 98–107)
CHOLEST SERPL-MCNC: 140 MG/DL (ref 0–200)
CO2 SERPL-SCNC: 25.3 MMOL/L (ref 22–29)
CREAT SERPL-MCNC: 0.89 MG/DL (ref 0.57–1)
DEPRECATED RDW RBC AUTO: 39.8 FL (ref 37–54)
EGFRCR SERPLBLD CKD-EPI 2021: 62.1 ML/MIN/1.73
EOSINOPHIL # BLD AUTO: 0.3 10*3/MM3 (ref 0–0.4)
EOSINOPHIL NFR BLD AUTO: 3.3 % (ref 0.3–6.2)
ERYTHROCYTE [DISTWIDTH] IN BLOOD BY AUTOMATED COUNT: 12 % (ref 12.3–15.4)
GLOBULIN UR ELPH-MCNC: 2.7 GM/DL
GLUCOSE SERPL-MCNC: 123 MG/DL (ref 65–99)
HBA1C MFR BLD: 6.3 % (ref 4.8–5.6)
HCT VFR BLD AUTO: 39.4 % (ref 34–46.6)
HDLC SERPL-MCNC: 55 MG/DL (ref 40–60)
HGB BLD-MCNC: 12.9 G/DL (ref 12–15.9)
IMM GRANULOCYTES # BLD AUTO: 0.04 10*3/MM3 (ref 0–0.05)
IMM GRANULOCYTES NFR BLD AUTO: 0.4 % (ref 0–0.5)
LDLC SERPL CALC-MCNC: 72 MG/DL (ref 0–100)
LDLC/HDLC SERPL: 1.31 {RATIO}
LYMPHOCYTES # BLD AUTO: 2.76 10*3/MM3 (ref 0.7–3.1)
LYMPHOCYTES NFR BLD AUTO: 30.6 % (ref 19.6–45.3)
MCH RBC QN AUTO: 29.7 PG (ref 26.6–33)
MCHC RBC AUTO-ENTMCNC: 32.7 G/DL (ref 31.5–35.7)
MCV RBC AUTO: 90.8 FL (ref 79–97)
MONOCYTES # BLD AUTO: 0.72 10*3/MM3 (ref 0.1–0.9)
MONOCYTES NFR BLD AUTO: 8 % (ref 5–12)
NEUTROPHILS NFR BLD AUTO: 5.11 10*3/MM3 (ref 1.7–7)
NEUTROPHILS NFR BLD AUTO: 56.8 % (ref 42.7–76)
NRBC BLD AUTO-RTO: 0 /100 WBC (ref 0–0.2)
PLATELET # BLD AUTO: 290 10*3/MM3 (ref 140–450)
PMV BLD AUTO: 10.4 FL (ref 6–12)
POTASSIUM SERPL-SCNC: 3.5 MMOL/L (ref 3.5–5.2)
PROT SERPL-MCNC: 6.7 G/DL (ref 6–8.5)
RBC # BLD AUTO: 4.34 10*6/MM3 (ref 3.77–5.28)
SODIUM SERPL-SCNC: 141 MMOL/L (ref 136–145)
T4 FREE SERPL-MCNC: 1.49 NG/DL (ref 0.93–1.7)
TRIGL SERPL-MCNC: 65 MG/DL (ref 0–150)
TSH SERPL DL<=0.05 MIU/L-ACNC: 4.52 UIU/ML (ref 0.27–4.2)
VLDLC SERPL-MCNC: 13 MG/DL (ref 5–40)
WBC NRBC COR # BLD: 9.01 10*3/MM3 (ref 3.4–10.8)

## 2022-05-12 PROCEDURE — 80061 LIPID PANEL: CPT

## 2022-05-12 PROCEDURE — 85025 COMPLETE CBC W/AUTO DIFF WBC: CPT

## 2022-05-12 PROCEDURE — 80053 COMPREHEN METABOLIC PANEL: CPT

## 2022-05-12 PROCEDURE — 83036 HEMOGLOBIN GLYCOSYLATED A1C: CPT

## 2022-05-12 PROCEDURE — 36415 COLL VENOUS BLD VENIPUNCTURE: CPT

## 2022-05-12 PROCEDURE — 84439 ASSAY OF FREE THYROXINE: CPT

## 2022-05-12 PROCEDURE — 84443 ASSAY THYROID STIM HORMONE: CPT

## 2022-05-18 ENCOUNTER — OFFICE VISIT (OUTPATIENT)
Dept: INTERNAL MEDICINE | Facility: CLINIC | Age: 87
End: 2022-05-18

## 2022-05-18 VITALS
HEIGHT: 63 IN | HEART RATE: 87 BPM | WEIGHT: 111.8 LBS | TEMPERATURE: 98 F | SYSTOLIC BLOOD PRESSURE: 161 MMHG | BODY MASS INDEX: 19.81 KG/M2 | DIASTOLIC BLOOD PRESSURE: 82 MMHG | OXYGEN SATURATION: 95 %

## 2022-05-18 DIAGNOSIS — I10 HYPERTENSION, ESSENTIAL: ICD-10-CM

## 2022-05-18 DIAGNOSIS — E11.9 TYPE 2 DIABETES MELLITUS WITHOUT COMPLICATION, WITHOUT LONG-TERM CURRENT USE OF INSULIN: ICD-10-CM

## 2022-05-18 DIAGNOSIS — M54.2 ACUTE NECK PAIN: Primary | ICD-10-CM

## 2022-05-18 DIAGNOSIS — I63.312 CEREBROVASCULAR ACCIDENT (CVA) DUE TO THROMBOSIS OF LEFT MIDDLE CEREBRAL ARTERY: ICD-10-CM

## 2022-05-18 DIAGNOSIS — F41.1 ANXIETY, GENERALIZED: ICD-10-CM

## 2022-05-18 DIAGNOSIS — J43.2 CENTRILOBULAR EMPHYSEMA: ICD-10-CM

## 2022-05-18 DIAGNOSIS — F33.1 MAJOR DEPRESSIVE DISORDER, RECURRENT, MODERATE: ICD-10-CM

## 2022-05-18 PROCEDURE — 99214 OFFICE O/P EST MOD 30 MIN: CPT | Performed by: INTERNAL MEDICINE

## 2022-05-18 NOTE — PROGRESS NOTES
"Chief Complaint/ HPI: Patient is here to follow-up with her breathing issues, blood pressure history of depression anxiety sugar and recent skin tear to the right arm which she developed on April 27 when she fell and hit her arm, healing well, also here to review labs,          Objective   Vital Signs  Vitals:    05/18/22 1341   BP: 161/82   Pulse: 87   Temp: 98 °F (36.7 °C)   SpO2: 95%   Weight: 50.7 kg (111 lb 12.8 oz)   Height: 160 cm (62.99\")      Body mass index is 19.81 kg/m².  Review of Systems   Constitutional: Negative.    HENT: Negative.    Eyes: Negative.    Respiratory: Negative.    Cardiovascular: Negative.    Gastrointestinal: Negative.    Endocrine: Negative.    Genitourinary: Negative.    Musculoskeletal: Negative.    Skin: Positive for skin lesions and wound.   Allergic/Immunologic: Negative.    Neurological: Negative.    Hematological: Negative.    Psychiatric/Behavioral: Negative.       Physical Exam  Constitutional:       General: She is not in acute distress.     Appearance: Normal appearance.   HENT:      Head: Normocephalic.      Mouth/Throat:      Mouth: Mucous membranes are moist.   Eyes:      Conjunctiva/sclera: Conjunctivae normal.      Pupils: Pupils are equal, round, and reactive to light.   Cardiovascular:      Rate and Rhythm: Normal rate and regular rhythm.      Pulses: Normal pulses.      Heart sounds: Normal heart sounds.   Pulmonary:      Effort: Pulmonary effort is normal.      Breath sounds: Normal breath sounds.   Abdominal:      General: Abdomen is flat. Bowel sounds are normal.      Palpations: Abdomen is soft.   Musculoskeletal:         General: No swelling. Normal range of motion.      Cervical back: Neck supple.   Skin:     General: Skin is warm and dry.      Coloration: Skin is not jaundiced.   Neurological:      General: No focal deficit present.      Mental Status: She is alert and oriented to person, place, and time. Mental status is at baseline.   Psychiatric:         " Mood and Affect: Mood normal.         Behavior: Behavior normal.         Thought Content: Thought content normal.         Judgment: Judgment normal.     Decreased breath sounds throughout, few rhonchi crackles at both bases, skin tear area shows healing skin on the right lateral forearm, no significant bright redness, or open areas,  Result Review :   No results found for: PROBNP, BNP  CMP    CMP 11/9/21 5/12/22   Glucose 111 (A) 123 (A)   BUN 18 19   Creatinine 0.84 0.89   eGFR Non African Am 64    Sodium 144 141   Potassium 3.8 3.5   Chloride 102 102   Calcium 9.8 9.5   Albumin 4.20 4.00   Total Bilirubin 0.4 0.5   Alkaline Phosphatase 92 97   AST (SGOT) 21 27   ALT (SGPT) 13 7   (A) Abnormal value            CBC w/diff    CBC w/Diff 11/9/21 5/12/22   WBC 9.94 9.01   RBC 4.56 4.34   Hemoglobin 13.8 12.9   Hematocrit 40.5 39.4   MCV 88.8 90.8   MCH 30.3 29.7   MCHC 34.1 32.7   RDW 11.9 (A) 12.0 (A)   Platelets 398 290   Neutrophil Rel % 55.1 56.8   Immature Granulocyte Rel % 0.6 (A) 0.4   Lymphocyte Rel % 32.8 30.6   Monocyte Rel % 8.2 8.0   Eosinophil Rel % 2.3 3.3   Basophil Rel % 1.0 0.9   (A) Abnormal value             Lipid Panel    Lipid Panel 11/9/21 5/12/22   Total Cholesterol 167 140   Triglycerides 90 65   HDL Cholesterol 53 55   VLDL Cholesterol 17 13   LDL Cholesterol  97 72   LDL/HDL Ratio 1.81 1.31            Lab Results   Component Value Date    TSH 4.520 (H) 05/12/2022    TSH 4.220 (H) 11/09/2021    TSH 3.040 10/01/2021      Lab Results   Component Value Date    FREET4 1.49 05/12/2022    FREET4 1.51 11/09/2021    FREET4 1.41 10/01/2021      A1C Last 3 Results    HGBA1C Last 3 Results 11/9/21 5/12/22   Hemoglobin A1C 6.52 (A) 6.30 (A)   (A) Abnormal value                              Visit Diagnoses:    ICD-10-CM ICD-9-CM   1. Acute neck pain  M54.2 723.1   2. Major depressive disorder, recurrent, moderate (HCC)  F33.1 296.32   3. Hypertension, essential  I10 401.9   4. Cerebrovascular accident (CVA)  due to thrombosis of left middle cerebral artery (HCC)  I63.312 434.01   5. Type 2 diabetes mellitus without complication, without long-term current use of insulin (HCC)  E11.9 250.00   6. Centrilobular emphysema (HCC)  J43.2 492.8   7. Anxiety, generalized  F41.1 300.02       Assessment and Plan   Diagnoses and all orders for this visit:    1. Acute neck pain (Primary)  -     Comprehensive Metabolic Panel; Future  -     Hemoglobin A1c; Future  -     CBC & Differential; Future  -     TSH+Free T4; Future    2. Major depressive disorder, recurrent, moderate (HCC)  -     Comprehensive Metabolic Panel; Future  -     Hemoglobin A1c; Future  -     CBC & Differential; Future  -     TSH+Free T4; Future    3. Hypertension, essential  -     Comprehensive Metabolic Panel; Future  -     Hemoglobin A1c; Future  -     CBC & Differential; Future  -     TSH+Free T4; Future    4. Cerebrovascular accident (CVA) due to thrombosis of left middle cerebral artery (HCC)  -     Comprehensive Metabolic Panel; Future  -     Hemoglobin A1c; Future  -     CBC & Differential; Future  -     TSH+Free T4; Future    5. Type 2 diabetes mellitus without complication, without long-term current use of insulin (HCC)  -     Comprehensive Metabolic Panel; Future  -     Hemoglobin A1c; Future  -     CBC & Differential; Future  -     TSH+Free T4; Future    6. Centrilobular emphysema (HCC)  -     Comprehensive Metabolic Panel; Future  -     Hemoglobin A1c; Future  -     CBC & Differential; Future  -     TSH+Free T4; Future    7. Anxiety, generalized  -     Comprehensive Metabolic Panel; Future  -     Hemoglobin A1c; Future  -     CBC & Differential; Future  -     TSH+Free T4; Future        Skin tear right forearm healing well okay to leave off bandage for a while okay to use antibiotic ointment or an Ace wrap if she wants, May 2022    Claudication lower legs, she does have severe arterial insufficiency left greater than right, has followed up with vascular  surgery in the past,, continues rosuvastatin,, Pletal    Anxiety depression, continues mirtazapine, 7.5 mg nightly    Hearing deficit, uses hearing aids    Left brain stroke left thalamus left internal capsule May 2014 recommended baby aspirin daily,    Hypertension continues Aldactazide 25/25 mg half tablet daily    COPD, discussed quitting tobacco, November 2021, and again May 2022    Hypothyroid previous left thyroidectomy, continues on Synthroid 0.05 mg daily, numbers are essentially stable May 2022 no changes will consider increasing dose if TSH continues to increase her free T4 drops,    Osteopenia remotely diagnosed,    Diabetes type 2 hemoglobin A1c previously 6.5, not taking any medications currently,, down to 6.3 May 2022    Cervical dystonia, pain, took muscle relaxers nov 2021 will refill Zanaflex 2 mg nightly as needed            Follow Up   Return in about 6 months (around 11/18/2022).  Patient was given instructions and counseling regarding her condition or for health maintenance advice. Please see specific information pulled into the AVS if appropriate.

## 2022-07-02 DIAGNOSIS — E11.9 TYPE 2 DIABETES MELLITUS WITHOUT COMPLICATIONS: ICD-10-CM

## 2022-07-05 RX ORDER — MIRTAZAPINE 7.5 MG/1
TABLET, FILM COATED ORAL
Qty: 30 TABLET | Refills: 4 | Status: SHIPPED | OUTPATIENT
Start: 2022-07-05

## 2022-07-22 RX ORDER — CILOSTAZOL 50 MG/1
TABLET ORAL
Qty: 60 TABLET | Refills: 5 | Status: SHIPPED | OUTPATIENT
Start: 2022-07-22 | End: 2022-11-09

## 2022-07-26 RX ORDER — SPIRONOLACTONE AND HYDROCHLOROTHIAZIDE 25; 25 MG/1; MG/1
TABLET ORAL
Qty: 45 TABLET | Refills: 4 | Status: SHIPPED | OUTPATIENT
Start: 2022-07-26

## 2022-08-08 RX ORDER — ROSUVASTATIN CALCIUM 5 MG/1
TABLET, COATED ORAL
Qty: 90 TABLET | Refills: 2 | Status: SHIPPED | OUTPATIENT
Start: 2022-08-08

## 2022-10-04 ENCOUNTER — LAB (OUTPATIENT)
Dept: LAB | Facility: HOSPITAL | Age: 87
End: 2022-10-04

## 2022-10-04 ENCOUNTER — TRANSCRIBE ORDERS (OUTPATIENT)
Dept: ADMINISTRATIVE | Facility: HOSPITAL | Age: 87
End: 2022-10-04

## 2022-10-04 DIAGNOSIS — E03.9 MYXEDEMA HEART DISEASE: Primary | ICD-10-CM

## 2022-10-04 DIAGNOSIS — I51.9 MYXEDEMA HEART DISEASE: ICD-10-CM

## 2022-10-04 DIAGNOSIS — E03.9 MYXEDEMA HEART DISEASE: ICD-10-CM

## 2022-10-04 DIAGNOSIS — I51.9 MYXEDEMA HEART DISEASE: Primary | ICD-10-CM

## 2022-10-04 LAB
T4 FREE SERPL-MCNC: 1.39 NG/DL (ref 0.93–1.7)
TSH SERPL DL<=0.05 MIU/L-ACNC: 3.18 UIU/ML (ref 0.27–4.2)

## 2022-10-04 PROCEDURE — 84439 ASSAY OF FREE THYROXINE: CPT

## 2022-10-04 PROCEDURE — 36415 COLL VENOUS BLD VENIPUNCTURE: CPT

## 2022-10-04 PROCEDURE — 84443 ASSAY THYROID STIM HORMONE: CPT

## 2022-11-09 RX ORDER — CILOSTAZOL 50 MG/1
TABLET ORAL
Qty: 60 TABLET | Refills: 5 | Status: SHIPPED | OUTPATIENT
Start: 2022-11-09

## 2022-11-22 ENCOUNTER — OFFICE VISIT (OUTPATIENT)
Dept: INTERNAL MEDICINE | Facility: CLINIC | Age: 87
End: 2022-11-22

## 2022-11-22 VITALS
WEIGHT: 110.8 LBS | DIASTOLIC BLOOD PRESSURE: 66 MMHG | SYSTOLIC BLOOD PRESSURE: 164 MMHG | OXYGEN SATURATION: 98 % | HEIGHT: 63 IN | BODY MASS INDEX: 19.63 KG/M2 | TEMPERATURE: 97.2 F | HEART RATE: 60 BPM

## 2022-11-22 DIAGNOSIS — I63.312 CEREBROVASCULAR ACCIDENT (CVA) DUE TO THROMBOSIS OF LEFT MIDDLE CEREBRAL ARTERY: ICD-10-CM

## 2022-11-22 DIAGNOSIS — F33.1 MAJOR DEPRESSIVE DISORDER, RECURRENT, MODERATE: ICD-10-CM

## 2022-11-22 DIAGNOSIS — F41.1 ANXIETY, GENERALIZED: ICD-10-CM

## 2022-11-22 DIAGNOSIS — G89.29 CHRONIC PAIN OF RIGHT ANKLE: Primary | ICD-10-CM

## 2022-11-22 DIAGNOSIS — E11.9 TYPE 2 DIABETES MELLITUS WITHOUT COMPLICATION, WITHOUT LONG-TERM CURRENT USE OF INSULIN: ICD-10-CM

## 2022-11-22 DIAGNOSIS — Z23 NEED FOR VACCINATION: ICD-10-CM

## 2022-11-22 DIAGNOSIS — I10 HYPERTENSION, ESSENTIAL: ICD-10-CM

## 2022-11-22 DIAGNOSIS — J43.2 CENTRILOBULAR EMPHYSEMA: ICD-10-CM

## 2022-11-22 DIAGNOSIS — M25.571 CHRONIC PAIN OF RIGHT ANKLE: Primary | ICD-10-CM

## 2022-11-22 LAB
ALBUMIN SERPL-MCNC: 4 G/DL (ref 3.5–5.2)
ALBUMIN/GLOB SERPL: 1.7 G/DL
ALP SERPL-CCNC: 81 U/L (ref 39–117)
ALT SERPL W P-5'-P-CCNC: 9 U/L (ref 1–33)
ANION GAP SERPL CALCULATED.3IONS-SCNC: 7 MMOL/L (ref 5–15)
AST SERPL-CCNC: 24 U/L (ref 1–32)
BASOPHILS # BLD AUTO: 0.08 10*3/MM3 (ref 0–0.2)
BASOPHILS NFR BLD AUTO: 0.8 % (ref 0–1.5)
BILIRUB SERPL-MCNC: 0.3 MG/DL (ref 0–1.2)
BUN SERPL-MCNC: 18 MG/DL (ref 8–23)
BUN/CREAT SERPL: 20 (ref 7–25)
CALCIUM SPEC-SCNC: 9.5 MG/DL (ref 8.2–9.6)
CHLORIDE SERPL-SCNC: 103 MMOL/L (ref 98–107)
CHOLEST SERPL-MCNC: 152 MG/DL (ref 0–200)
CO2 SERPL-SCNC: 30 MMOL/L (ref 22–29)
CREAT SERPL-MCNC: 0.9 MG/DL (ref 0.57–1)
DEPRECATED RDW RBC AUTO: 41.3 FL (ref 37–54)
EGFRCR SERPLBLD CKD-EPI 2021: 60.9 ML/MIN/1.73
EOSINOPHIL # BLD AUTO: 0.15 10*3/MM3 (ref 0–0.4)
EOSINOPHIL NFR BLD AUTO: 1.5 % (ref 0.3–6.2)
ERYTHROCYTE [DISTWIDTH] IN BLOOD BY AUTOMATED COUNT: 12.3 % (ref 12.3–15.4)
GLOBULIN UR ELPH-MCNC: 2.4 GM/DL
GLUCOSE SERPL-MCNC: 83 MG/DL (ref 65–99)
HBA1C MFR BLD: 6.2 % (ref 4.8–5.6)
HCT VFR BLD AUTO: 37.7 % (ref 34–46.6)
HDLC SERPL-MCNC: 54 MG/DL (ref 40–60)
HGB BLD-MCNC: 12 G/DL (ref 12–15.9)
IMM GRANULOCYTES # BLD AUTO: 0.05 10*3/MM3 (ref 0–0.05)
IMM GRANULOCYTES NFR BLD AUTO: 0.5 % (ref 0–0.5)
LDLC SERPL CALC-MCNC: 84 MG/DL (ref 0–100)
LDLC/HDLC SERPL: 1.55 {RATIO}
LYMPHOCYTES # BLD AUTO: 2.61 10*3/MM3 (ref 0.7–3.1)
LYMPHOCYTES NFR BLD AUTO: 25.9 % (ref 19.6–45.3)
MCH RBC QN AUTO: 29.1 PG (ref 26.6–33)
MCHC RBC AUTO-ENTMCNC: 31.8 G/DL (ref 31.5–35.7)
MCV RBC AUTO: 91.5 FL (ref 79–97)
MONOCYTES # BLD AUTO: 0.71 10*3/MM3 (ref 0.1–0.9)
MONOCYTES NFR BLD AUTO: 7.1 % (ref 5–12)
NEUTROPHILS NFR BLD AUTO: 6.46 10*3/MM3 (ref 1.7–7)
NEUTROPHILS NFR BLD AUTO: 64.2 % (ref 42.7–76)
NRBC BLD AUTO-RTO: 0 /100 WBC (ref 0–0.2)
PLATELET # BLD AUTO: 326 10*3/MM3 (ref 140–450)
PMV BLD AUTO: 10.4 FL (ref 6–12)
POTASSIUM SERPL-SCNC: 3.7 MMOL/L (ref 3.5–5.2)
PROT SERPL-MCNC: 6.4 G/DL (ref 6–8.5)
RBC # BLD AUTO: 4.12 10*6/MM3 (ref 3.77–5.28)
SODIUM SERPL-SCNC: 140 MMOL/L (ref 136–145)
T4 FREE SERPL-MCNC: 1.28 NG/DL (ref 0.93–1.7)
TRIGL SERPL-MCNC: 71 MG/DL (ref 0–150)
TSH SERPL DL<=0.05 MIU/L-ACNC: 3.53 UIU/ML (ref 0.27–4.2)
URATE SERPL-MCNC: 5.4 MG/DL (ref 2.4–5.7)
VLDLC SERPL-MCNC: 14 MG/DL (ref 5–40)
WBC NRBC COR # BLD: 10.06 10*3/MM3 (ref 3.4–10.8)

## 2022-11-22 PROCEDURE — 99214 OFFICE O/P EST MOD 30 MIN: CPT | Performed by: INTERNAL MEDICINE

## 2022-11-22 PROCEDURE — G0008 ADMIN INFLUENZA VIRUS VAC: HCPCS | Performed by: INTERNAL MEDICINE

## 2022-11-22 PROCEDURE — 84443 ASSAY THYROID STIM HORMONE: CPT | Performed by: INTERNAL MEDICINE

## 2022-11-22 PROCEDURE — 36415 COLL VENOUS BLD VENIPUNCTURE: CPT | Performed by: INTERNAL MEDICINE

## 2022-11-22 PROCEDURE — 85025 COMPLETE CBC W/AUTO DIFF WBC: CPT | Performed by: INTERNAL MEDICINE

## 2022-11-22 PROCEDURE — 80061 LIPID PANEL: CPT | Performed by: INTERNAL MEDICINE

## 2022-11-22 PROCEDURE — 84550 ASSAY OF BLOOD/URIC ACID: CPT | Performed by: INTERNAL MEDICINE

## 2022-11-22 PROCEDURE — 80053 COMPREHEN METABOLIC PANEL: CPT | Performed by: INTERNAL MEDICINE

## 2022-11-22 PROCEDURE — 90662 IIV NO PRSV INCREASED AG IM: CPT | Performed by: INTERNAL MEDICINE

## 2022-11-22 PROCEDURE — 84439 ASSAY OF FREE THYROXINE: CPT | Performed by: INTERNAL MEDICINE

## 2022-11-22 PROCEDURE — 83036 HEMOGLOBIN GLYCOSYLATED A1C: CPT | Performed by: INTERNAL MEDICINE

## 2022-11-22 RX ORDER — METHYLPREDNISOLONE 4 MG/1
TABLET ORAL
Qty: 21 EACH | Refills: 0 | Status: SHIPPED | OUTPATIENT
Start: 2022-11-22 | End: 2022-11-27

## 2022-11-22 NOTE — PROGRESS NOTES
"Chief Complaint/ HPI: Patient is here for routine follow-up follow-up with COPD, blood pressure and labs, here with her daughter, she is having some right ankle pain still continues to hurt on a pretty constant basis except when she is off of it in bed,    breathing issues, blood pressure history of depression anxiety sugar     Objective   Vital Signs  Vitals:    11/22/22 1356   BP: 164/66   Pulse: 60   Temp: 97.2 °F (36.2 °C)   SpO2: 98%   Weight: 50.3 kg (110 lb 12.8 oz)   Height: 160 cm (62.99\")      Body mass index is 19.63 kg/m².  Review of Systems   HENT: Negative.    Eyes: Negative.    Respiratory: Negative.    Cardiovascular: Negative.    Gastrointestinal: Negative.    Endocrine: Negative.    Genitourinary: Negative.    Musculoskeletal: Positive for joint swelling (Right ankle swelling and pain especially getting up and walking around,).   Allergic/Immunologic: Negative.    Neurological: Positive for weakness.   Hematological: Negative.    Psychiatric/Behavioral: Negative.       Physical Exam  Constitutional:       General: She is not in acute distress.     Appearance: Normal appearance.   HENT:      Head: Normocephalic.      Mouth/Throat:      Mouth: Mucous membranes are moist.   Eyes:      Conjunctiva/sclera: Conjunctivae normal.      Pupils: Pupils are equal, round, and reactive to light.   Cardiovascular:      Rate and Rhythm: Normal rate. Rhythm irregular.      Pulses: Normal pulses.      Heart sounds: Normal heart sounds.   Pulmonary:      Effort: Pulmonary effort is normal.   Abdominal:      General: Abdomen is flat. Bowel sounds are normal.      Palpations: Abdomen is soft.   Musculoskeletal:         General: No swelling. Normal range of motion.      Cervical back: Neck supple.   Skin:     General: Skin is warm and dry.      Coloration: Skin is not jaundiced.   Neurological:      General: No focal deficit present.      Mental Status: She is alert and oriented to person, place, and time. Mental status " is at baseline.   Psychiatric:         Mood and Affect: Mood normal.         Behavior: Behavior normal.         Thought Content: Thought content normal.         Judgment: Judgment normal.     Decreased breath sounds throughout otherwise clear no wheezing, cardiac exam slightly irregular rhythm  Result Review :   No results found for: PROBNP, BNP  CMP    CMP 5/12/22   Glucose 123 (A)   BUN 19   Creatinine 0.89   Sodium 141   Potassium 3.5   Chloride 102   Calcium 9.5   Albumin 4.00   Total Bilirubin 0.5   Alkaline Phosphatase 97   AST (SGOT) 27   ALT (SGPT) 7   (A) Abnormal value            CBC w/diff    CBC w/Diff 5/12/22   WBC 9.01   RBC 4.34   Hemoglobin 12.9   Hematocrit 39.4   MCV 90.8   MCH 29.7   MCHC 32.7   RDW 12.0 (A)   Platelets 290   Neutrophil Rel % 56.8   Immature Granulocyte Rel % 0.4   Lymphocyte Rel % 30.6   Monocyte Rel % 8.0   Eosinophil Rel % 3.3   Basophil Rel % 0.9   (A) Abnormal value             Lipid Panel    Lipid Panel 5/12/22   Total Cholesterol 140   Triglycerides 65   HDL Cholesterol 55   VLDL Cholesterol 13   LDL Cholesterol  72   LDL/HDL Ratio 1.31            Lab Results   Component Value Date    TSH 3.180 10/04/2022    TSH 4.520 (H) 05/12/2022    TSH 4.220 (H) 11/09/2021      Lab Results   Component Value Date    FREET4 1.39 10/04/2022    FREET4 1.49 05/12/2022    FREET4 1.51 11/09/2021      A1C Last 3 Results    HGBA1C Last 3 Results 5/12/22   Hemoglobin A1C 6.30 (A)   (A) Abnormal value                              Visit Diagnoses:    ICD-10-CM ICD-9-CM   1. Chronic pain of right ankle  M25.571 719.47    G89.29 338.29   2. Type 2 diabetes mellitus without complication, without long-term current use of insulin (HCC)  E11.9 250.00   3. Centrilobular emphysema (HCC)  J43.2 492.8   4. Anxiety, generalized  F41.1 300.02   5. Cerebrovascular accident (CVA) due to thrombosis of left middle cerebral artery (HCC)  I63.312 434.01   6. Hypertension, essential  I10 401.9   7. Major depressive  disorder, recurrent, moderate (HCC)  F33.1 296.32       Assessment and Plan   Diagnoses and all orders for this visit:    1. Chronic pain of right ankle (Primary)  -     TSH+Free T4; Future  -     Lipid Panel; Future  -     Comprehensive Metabolic Panel; Future  -     CBC & Differential; Future  -     Hemoglobin A1c; Future  -     Uric Acid; Future    2. Type 2 diabetes mellitus without complication, without long-term current use of insulin (HCC)  -     TSH+Free T4; Future  -     Lipid Panel; Future  -     Comprehensive Metabolic Panel; Future  -     CBC & Differential; Future  -     Hemoglobin A1c; Future  -     Uric Acid; Future    3. Centrilobular emphysema (HCC)  -     TSH+Free T4; Future  -     Lipid Panel; Future  -     Comprehensive Metabolic Panel; Future  -     CBC & Differential; Future  -     Hemoglobin A1c; Future  -     Uric Acid; Future    4. Anxiety, generalized  -     TSH+Free T4; Future  -     Lipid Panel; Future  -     Comprehensive Metabolic Panel; Future  -     CBC & Differential; Future  -     Hemoglobin A1c; Future  -     Uric Acid; Future    5. Cerebrovascular accident (CVA) due to thrombosis of left middle cerebral artery (HCC)  -     TSH+Free T4; Future  -     Lipid Panel; Future  -     Comprehensive Metabolic Panel; Future  -     CBC & Differential; Future  -     Hemoglobin A1c; Future  -     Uric Acid; Future    6. Hypertension, essential  -     TSH+Free T4; Future  -     Lipid Panel; Future  -     Comprehensive Metabolic Panel; Future  -     CBC & Differential; Future  -     Hemoglobin A1c; Future  -     Uric Acid; Future    7. Major depressive disorder, recurrent, moderate (HCC)  -     TSH+Free T4; Future  -     Lipid Panel; Future  -     Comprehensive Metabolic Panel; Future  -     CBC & Differential; Future  -     Hemoglobin A1c; Future  -     Uric Acid; Future    Other orders  -     methylPREDNISolone (MEDROL) 4 MG dose pack; Take as directed on package instructions.  Dispense: 21  each; Refill: 0    COVID infection September 2022, she is doing better overall,    Right ankle pain tenderness to palpation, seems to be on a chronic basis possible gout, arthritis, consider repeat x-rays, will check uric acid level today with other labs, consider going back to orthopedic or podiatry, will send in colchicine or Medrol Dosepak if necessary    Flu shot, COVID shot today November 22, 2022    Tremors, upper extremities neck head, all since COVID according to patient daughter, November 2022 treatment options with low-dose benzos discussed risks etc. November 22, 2022    Claudication lower legs, she does have severe arterial insufficiency left greater than right, has followed up with vascular surgery in the past,, continues rosuvastatin,, Pletal    Anxiety depression, continues mirtazapine, 7.5 mg nightly    Hearing deficit, uses hearing aids    Memory deficits, consider treatment options, discussed November 2022, continues over-the-counter Prevagen    Left brain stroke left thalamus left internal capsule May 2014 recommended baby aspirin daily,    Hypertension continues Aldactazide 25/25 mg half tablet daily    COPD, discussed quitting tobacco, November 2021, and again May 2022    Hypothyroid ----previous left thyroidectomy, continues on Synthroid 0.05 mg daily, still sees a nurse practitioner at [facility]    Osteopenia remotely diagnosed,    Diabetes type 2  not taking any medications currently,, down to 6.3 May 2022    Cervical dystonia, pain, continues Zanaflex 2 mg nightly as needed    Follow Up   Return in about 6 months (around 5/22/2023).  Patient was given instructions and counseling regarding her condition or for health maintenance advice. Please see specific information pulled into the AVS if appropriate.

## 2023-05-09 ENCOUNTER — TELEPHONE (OUTPATIENT)
Dept: INTERNAL MEDICINE | Facility: CLINIC | Age: 88
End: 2023-05-09
Payer: MEDICARE

## 2023-05-09 NOTE — TELEPHONE ENCOUNTER
Caller: DENISE (POA),EDWIN    Relationship to patient: Emergency Contact    Best call back number: [phone]     Patient is needing: PATIENT'S DAUGHTER WOULD LIKE TO KNOW IF SHE NEEDS TO TAKE HER MOM IN FOR BLOOD WORK BEFORE HER UPCOMING APPOINTMENT. VOICEMAIL OK.

## 2023-05-11 ENCOUNTER — LAB (OUTPATIENT)
Dept: LAB | Facility: HOSPITAL | Age: 88
End: 2023-05-11
Payer: MEDICARE

## 2023-05-11 DIAGNOSIS — F33.1 MAJOR DEPRESSIVE DISORDER, RECURRENT, MODERATE: ICD-10-CM

## 2023-05-11 DIAGNOSIS — M54.2 ACUTE NECK PAIN: ICD-10-CM

## 2023-05-11 DIAGNOSIS — I10 HYPERTENSION, ESSENTIAL: ICD-10-CM

## 2023-05-11 DIAGNOSIS — E11.9 TYPE 2 DIABETES MELLITUS WITHOUT COMPLICATION, WITHOUT LONG-TERM CURRENT USE OF INSULIN: ICD-10-CM

## 2023-05-11 DIAGNOSIS — J43.2 CENTRILOBULAR EMPHYSEMA: ICD-10-CM

## 2023-05-11 DIAGNOSIS — F41.1 ANXIETY, GENERALIZED: ICD-10-CM

## 2023-05-11 DIAGNOSIS — I63.312 CEREBROVASCULAR ACCIDENT (CVA) DUE TO THROMBOSIS OF LEFT MIDDLE CEREBRAL ARTERY: ICD-10-CM

## 2023-05-11 LAB
ALBUMIN SERPL-MCNC: 4.3 G/DL (ref 3.5–5.2)
ALBUMIN/GLOB SERPL: 1.4 G/DL
ALP SERPL-CCNC: 84 U/L (ref 39–117)
ALT SERPL W P-5'-P-CCNC: 12 U/L (ref 1–33)
ANION GAP SERPL CALCULATED.3IONS-SCNC: 12.5 MMOL/L (ref 5–15)
AST SERPL-CCNC: 15 U/L (ref 1–32)
BASOPHILS # BLD AUTO: 0.12 10*3/MM3 (ref 0–0.2)
BASOPHILS NFR BLD AUTO: 1.3 % (ref 0–1.5)
BILIRUB SERPL-MCNC: 0.4 MG/DL (ref 0–1.2)
BUN SERPL-MCNC: 19 MG/DL (ref 8–23)
BUN/CREAT SERPL: 18.3 (ref 7–25)
CALCIUM SPEC-SCNC: 10.7 MG/DL (ref 8.2–9.6)
CHLORIDE SERPL-SCNC: 100 MMOL/L (ref 98–107)
CO2 SERPL-SCNC: 28.5 MMOL/L (ref 22–29)
CREAT SERPL-MCNC: 1.04 MG/DL (ref 0.57–1)
DEPRECATED RDW RBC AUTO: 39.4 FL (ref 37–54)
EGFRCR SERPLBLD CKD-EPI 2021: 51.2 ML/MIN/1.73
EOSINOPHIL # BLD AUTO: 0.14 10*3/MM3 (ref 0–0.4)
EOSINOPHIL NFR BLD AUTO: 1.5 % (ref 0.3–6.2)
ERYTHROCYTE [DISTWIDTH] IN BLOOD BY AUTOMATED COUNT: 11.8 % (ref 12.3–15.4)
GLOBULIN UR ELPH-MCNC: 3.1 GM/DL
GLUCOSE SERPL-MCNC: 93 MG/DL (ref 65–99)
HBA1C MFR BLD: 6.5 % (ref 4.8–5.6)
HCT VFR BLD AUTO: 43 % (ref 34–46.6)
HGB BLD-MCNC: 14.5 G/DL (ref 12–15.9)
IMM GRANULOCYTES # BLD AUTO: 0.04 10*3/MM3 (ref 0–0.05)
IMM GRANULOCYTES NFR BLD AUTO: 0.4 % (ref 0–0.5)
LYMPHOCYTES # BLD AUTO: 2.37 10*3/MM3 (ref 0.7–3.1)
LYMPHOCYTES NFR BLD AUTO: 25.5 % (ref 19.6–45.3)
MCH RBC QN AUTO: 30.5 PG (ref 26.6–33)
MCHC RBC AUTO-ENTMCNC: 33.7 G/DL (ref 31.5–35.7)
MCV RBC AUTO: 90.3 FL (ref 79–97)
MONOCYTES # BLD AUTO: 0.66 10*3/MM3 (ref 0.1–0.9)
MONOCYTES NFR BLD AUTO: 7.1 % (ref 5–12)
NEUTROPHILS NFR BLD AUTO: 5.97 10*3/MM3 (ref 1.7–7)
NEUTROPHILS NFR BLD AUTO: 64.2 % (ref 42.7–76)
NRBC BLD AUTO-RTO: 0 /100 WBC (ref 0–0.2)
PLATELET # BLD AUTO: 241 10*3/MM3 (ref 140–450)
PMV BLD AUTO: 10.9 FL (ref 6–12)
POTASSIUM SERPL-SCNC: 3.8 MMOL/L (ref 3.5–5.2)
PROT SERPL-MCNC: 7.4 G/DL (ref 6–8.5)
RBC # BLD AUTO: 4.76 10*6/MM3 (ref 3.77–5.28)
SODIUM SERPL-SCNC: 141 MMOL/L (ref 136–145)
T4 FREE SERPL-MCNC: 1.52 NG/DL (ref 0.93–1.7)
TSH SERPL DL<=0.05 MIU/L-ACNC: 3.9 UIU/ML (ref 0.27–4.2)
WBC NRBC COR # BLD: 9.3 10*3/MM3 (ref 3.4–10.8)

## 2023-05-11 PROCEDURE — 85025 COMPLETE CBC W/AUTO DIFF WBC: CPT

## 2023-05-11 PROCEDURE — 36415 COLL VENOUS BLD VENIPUNCTURE: CPT

## 2023-05-11 PROCEDURE — 84439 ASSAY OF FREE THYROXINE: CPT

## 2023-05-11 PROCEDURE — 83036 HEMOGLOBIN GLYCOSYLATED A1C: CPT

## 2023-05-11 PROCEDURE — 84443 ASSAY THYROID STIM HORMONE: CPT

## 2023-05-11 PROCEDURE — 80053 COMPREHEN METABOLIC PANEL: CPT

## 2023-05-16 ENCOUNTER — OFFICE VISIT (OUTPATIENT)
Dept: INTERNAL MEDICINE | Facility: CLINIC | Age: 88
End: 2023-05-16
Payer: MEDICARE

## 2023-05-16 VITALS
OXYGEN SATURATION: 95 % | BODY MASS INDEX: 19.77 KG/M2 | DIASTOLIC BLOOD PRESSURE: 68 MMHG | TEMPERATURE: 97.5 F | SYSTOLIC BLOOD PRESSURE: 178 MMHG | HEART RATE: 69 BPM | WEIGHT: 111.6 LBS | HEIGHT: 63 IN

## 2023-05-16 DIAGNOSIS — F41.1 ANXIETY, GENERALIZED: ICD-10-CM

## 2023-05-16 DIAGNOSIS — I10 HYPERTENSION, ESSENTIAL: ICD-10-CM

## 2023-05-16 DIAGNOSIS — E11.9 TYPE 2 DIABETES MELLITUS WITHOUT COMPLICATION, WITHOUT LONG-TERM CURRENT USE OF INSULIN: ICD-10-CM

## 2023-05-16 DIAGNOSIS — F33.1 MAJOR DEPRESSIVE DISORDER, RECURRENT, MODERATE: Primary | ICD-10-CM

## 2023-05-16 DIAGNOSIS — J43.2 CENTRILOBULAR EMPHYSEMA: ICD-10-CM

## 2023-05-16 NOTE — PROGRESS NOTES
"CHIEF COMPLAINT/ HPI:  Diabetes and Follow-up (Patient is here for a routine follow )    Follow-up with history of weight loss, COPD, seems to be doing well for 89 years old she still lives alone, good family support,          Objective   Vital Signs  Vitals:    05/16/23 1347   BP: 178/68   Pulse: 69   Temp: 97.5 °F (36.4 °C)   SpO2: 95%   Weight: 50.6 kg (111 lb 9.6 oz)   Height: 160 cm (62.99\")      Body mass index is 19.77 kg/m².  Review of Systems   Constitutional: Negative.    HENT: Negative.    Eyes: Negative.    Respiratory: Negative.    Cardiovascular: Negative.    Gastrointestinal: Negative.    Endocrine: Negative.    Genitourinary: Negative.    Musculoskeletal: Negative.    Allergic/Immunologic: Negative.    Neurological: Negative.    Hematological: Negative.    Psychiatric/Behavioral: Negative.       Physical Exam  Constitutional:       General: She is not in acute distress.     Appearance: Normal appearance.   HENT:      Head: Normocephalic.      Mouth/Throat:      Mouth: Mucous membranes are moist.   Eyes:      Conjunctiva/sclera: Conjunctivae normal.      Pupils: Pupils are equal, round, and reactive to light.   Cardiovascular:      Rate and Rhythm: Normal rate and regular rhythm.      Pulses: Normal pulses.      Heart sounds: Normal heart sounds.   Pulmonary:      Effort: Pulmonary effort is normal.      Breath sounds: Rhonchi (Diminished breath sounds bilateral,) present.   Abdominal:      General: Abdomen is flat. Bowel sounds are normal.      Palpations: Abdomen is soft.   Musculoskeletal:         General: No swelling. Normal range of motion.      Cervical back: Neck supple.   Skin:     General: Skin is warm and dry.      Coloration: Skin is not jaundiced.   Neurological:      General: No focal deficit present.      Mental Status: She is alert and oriented to person, place, and time. Mental status is at baseline.   Psychiatric:         Mood and Affect: Mood normal.         Behavior: Behavior " normal.         Thought Content: Thought content normal.         Judgment: Judgment normal.        Result Review :   No results found for: PROBNP, BNP  CMP        11/22/2022    14:29 5/11/2023    10:49   CMP   Glucose 83   93     BUN 18   19     Creatinine 0.90   1.04     EGFR 60.9   51.2     Sodium 140   141     Potassium 3.7   3.8     Chloride 103   100     Calcium 9.5   10.7     Total Protein 6.4   7.4     Albumin 4.00   4.3     Globulin 2.4   3.1     Total Bilirubin 0.3   0.4     Alkaline Phosphatase 81   84     AST (SGOT) 24   15     ALT (SGPT) 9   12     Albumin/Globulin Ratio 1.7   1.4     BUN/Creatinine Ratio 20.0   18.3     Anion Gap 7.0   12.5       CBC w/diff        11/22/2022    14:29 5/11/2023    10:49   CBC w/Diff   WBC 10.06   9.30     RBC 4.12   4.76     Hemoglobin 12.0   14.5     Hematocrit 37.7   43.0     MCV 91.5   90.3     MCH 29.1   30.5     MCHC 31.8   33.7     RDW 12.3   11.8     Platelets 326   241     Neutrophil Rel % 64.2   64.2     Immature Granulocyte Rel % 0.5   0.4     Lymphocyte Rel % 25.9   25.5     Monocyte Rel % 7.1   7.1     Eosinophil Rel % 1.5   1.5     Basophil Rel % 0.8   1.3        Lipid Panel        11/22/2022    14:29   Lipid Panel   Total Cholesterol 152     Triglycerides 71     HDL Cholesterol 54     VLDL Cholesterol 14     LDL Cholesterol  84     LDL/HDL Ratio 1.55        Lab Results   Component Value Date    TSH 3.900 05/11/2023    TSH 3.530 11/22/2022    TSH 3.180 10/04/2022      Lab Results   Component Value Date    FREET4 1.52 05/11/2023    FREET4 1.28 11/22/2022    FREET4 1.39 10/04/2022      A1C Last 3 Results        11/22/2022    14:29 5/11/2023    10:49   HGBA1C Last 3 Results   Hemoglobin A1C 6.20   6.50                         Visit Diagnoses:    ICD-10-CM ICD-9-CM   1. Major depressive disorder, recurrent, moderate  F33.1 296.32   2. Hypertension, essential  I10 401.9   3. Centrilobular emphysema  J43.2 492.8   4. Type 2 diabetes mellitus without complication,  without long-term current use of insulin  E11.9 250.00   5. Anxiety, generalized  F41.1 300.02       Assessment and Plan   Diagnoses and all orders for this visit:    1. Major depressive disorder, recurrent, moderate (Primary)  -     Microalbumin / Creatinine Urine Ratio - Urine, Clean Catch; Future  -     CBC & Differential; Future  -     Comprehensive Metabolic Panel; Future  -     Hemoglobin A1c; Future  -     TSH+Free T4; Future  -     Lipid Panel; Future    2. Hypertension, essential  -     Microalbumin / Creatinine Urine Ratio - Urine, Clean Catch; Future  -     CBC & Differential; Future  -     Comprehensive Metabolic Panel; Future  -     Hemoglobin A1c; Future  -     TSH+Free T4; Future  -     Lipid Panel; Future    3. Centrilobular emphysema  -     Microalbumin / Creatinine Urine Ratio - Urine, Clean Catch; Future  -     CBC & Differential; Future  -     Comprehensive Metabolic Panel; Future  -     Hemoglobin A1c; Future  -     TSH+Free T4; Future  -     Lipid Panel; Future    4. Type 2 diabetes mellitus without complication, without long-term current use of insulin  -     Microalbumin / Creatinine Urine Ratio - Urine, Clean Catch; Future  -     CBC & Differential; Future  -     Comprehensive Metabolic Panel; Future  -     Hemoglobin A1c; Future  -     TSH+Free T4; Future  -     Lipid Panel; Future    5. Anxiety, generalized  -     Microalbumin / Creatinine Urine Ratio - Urine, Clean Catch; Future  -     CBC & Differential; Future  -     Comprehensive Metabolic Panel; Future  -     Hemoglobin A1c; Future  -     TSH+Free T4; Future  -     Lipid Panel; Future        COVID infection September 2022, she is doing better overall,    Flu shot, COVID shot today November 22, 2022    Claudication lower legs, she does have severe arterial insufficiency left greater than right, has followed up with vascular surgery in the past,, continues rosuvastatin,, Pletal    Anxiety depression, continues mirtazapine, 7.5 mg  nightly    Hearing deficit, uses hearing aids    Memory deficits, consider treatment options, discussed November 2022, continues over-the-counter Prevagen    Left brain stroke left thalamus left internal capsule May 2014 recommended baby aspirin daily,    Hypertension continues Aldactazide 25/25 mg half tablet daily    COPD, discussed quitting tobacco, again May 2023    Hypothyroid ----previous left thyroidectomy, continues  Synthroid 0.05 mg daily, still sees a nurse practitioner at [facility]    Osteopenia remotely diagnosed,    Diabetes type 2 --- 6.5, May 11, 2023 ---not taking any medications currently,,     Cervical dystonia, pain, continues Zanaflex 2 mg nightly as needed    Follow Up   No follow-ups on file.  Patient was given instructions and counseling regarding her condition or for health maintenance advice. Please see specific information pulled into the AVS if appropriate.

## 2023-09-18 RX ORDER — CILOSTAZOL 50 MG/1
TABLET ORAL
Qty: 120 TABLET | Refills: 5 | Status: SHIPPED | OUTPATIENT
Start: 2023-09-18

## 2023-09-26 ENCOUNTER — TELEPHONE (OUTPATIENT)
Dept: INTERNAL MEDICINE | Facility: CLINIC | Age: 88
End: 2023-09-26
Payer: MEDICARE

## 2023-09-26 RX ORDER — SPIRONOLACTONE AND HYDROCHLOROTHIAZIDE 25; 25 MG/1; MG/1
0.5 TABLET ORAL DAILY
Qty: 45 TABLET | Refills: 4 | Status: SHIPPED | OUTPATIENT
Start: 2023-09-26

## 2023-09-26 NOTE — TELEPHONE ENCOUNTER
Caller: DENISE (POA),EDWIN    Relationship: Emergency Contact    Best call back number: [phone]     Requested Prescriptions:   Requested Prescriptions     Pending Prescriptions Disp Refills    spironolactone-hydrochlorothiazide (ALDACTAZIDE) 25-25 MG tablet 45 tablet 4     Sig: Take 0.5 tablets by mouth Daily.        Pharmacy where request should be sent: [address] [phone] [facility] [phone]      Last office visit with prescribing clinician: 5/16/2023   Last telemedicine visit with prescribing clinician: Visit date not found   Next office visit with prescribing clinician: 11/16/2023     Does the patient have less than a 3 day supply:  [] Yes  [x] No    Would you like a call back once the refill request has been completed: [] Yes [x] No    If the office needs to give you a call back, can they leave a voicemail: [] Yes [x] No    Betty Jiang Rep   09/26/23 11:28 EDT

## 2023-11-09 ENCOUNTER — LAB (OUTPATIENT)
Dept: LAB | Facility: HOSPITAL | Age: 88
End: 2023-11-09
Payer: MEDICARE

## 2023-11-09 DIAGNOSIS — E11.9 TYPE 2 DIABETES MELLITUS WITHOUT COMPLICATION, WITHOUT LONG-TERM CURRENT USE OF INSULIN: ICD-10-CM

## 2023-11-09 DIAGNOSIS — J43.2 CENTRILOBULAR EMPHYSEMA: ICD-10-CM

## 2023-11-09 DIAGNOSIS — F33.1 MAJOR DEPRESSIVE DISORDER, RECURRENT, MODERATE: ICD-10-CM

## 2023-11-09 DIAGNOSIS — F41.1 ANXIETY, GENERALIZED: ICD-10-CM

## 2023-11-09 DIAGNOSIS — I10 HYPERTENSION, ESSENTIAL: ICD-10-CM

## 2023-11-09 LAB
ALBUMIN SERPL-MCNC: 3.9 G/DL (ref 3.5–5.2)
ALBUMIN UR-MCNC: 2.8 MG/DL
ALBUMIN/GLOB SERPL: 1.3 G/DL
ALP SERPL-CCNC: 77 U/L (ref 39–117)
ALT SERPL W P-5'-P-CCNC: 9 U/L (ref 1–33)
ANION GAP SERPL CALCULATED.3IONS-SCNC: 8.7 MMOL/L (ref 5–15)
AST SERPL-CCNC: 22 U/L (ref 1–32)
BASOPHILS # BLD AUTO: 0.08 10*3/MM3 (ref 0–0.2)
BASOPHILS NFR BLD AUTO: 0.9 % (ref 0–1.5)
BILIRUB SERPL-MCNC: 0.5 MG/DL (ref 0–1.2)
BUN SERPL-MCNC: 19 MG/DL (ref 8–23)
BUN/CREAT SERPL: 18.4 (ref 7–25)
CALCIUM SPEC-SCNC: 9.5 MG/DL (ref 8.2–9.6)
CHLORIDE SERPL-SCNC: 103 MMOL/L (ref 98–107)
CHOLEST SERPL-MCNC: 201 MG/DL (ref 0–200)
CO2 SERPL-SCNC: 27.3 MMOL/L (ref 22–29)
CREAT SERPL-MCNC: 1.03 MG/DL (ref 0.57–1)
CREAT UR-MCNC: 101.3 MG/DL
DEPRECATED RDW RBC AUTO: 40.2 FL (ref 37–54)
EGFRCR SERPLBLD CKD-EPI 2021: 51.4 ML/MIN/1.73
EOSINOPHIL # BLD AUTO: 0.2 10*3/MM3 (ref 0–0.4)
EOSINOPHIL NFR BLD AUTO: 2.1 % (ref 0.3–6.2)
ERYTHROCYTE [DISTWIDTH] IN BLOOD BY AUTOMATED COUNT: 11.9 % (ref 12.3–15.4)
GLOBULIN UR ELPH-MCNC: 2.9 GM/DL
GLUCOSE SERPL-MCNC: 127 MG/DL (ref 65–99)
HBA1C MFR BLD: 6.3 % (ref 4.8–5.6)
HCT VFR BLD AUTO: 41.1 % (ref 34–46.6)
HDLC SERPL-MCNC: 53 MG/DL (ref 40–60)
HGB BLD-MCNC: 13.4 G/DL (ref 12–15.9)
IMM GRANULOCYTES # BLD AUTO: 0.05 10*3/MM3 (ref 0–0.05)
IMM GRANULOCYTES NFR BLD AUTO: 0.5 % (ref 0–0.5)
LDLC SERPL CALC-MCNC: 132 MG/DL (ref 0–100)
LDLC/HDLC SERPL: 2.46 {RATIO}
LYMPHOCYTES # BLD AUTO: 2.68 10*3/MM3 (ref 0.7–3.1)
LYMPHOCYTES NFR BLD AUTO: 28.7 % (ref 19.6–45.3)
MCH RBC QN AUTO: 29.8 PG (ref 26.6–33)
MCHC RBC AUTO-ENTMCNC: 32.6 G/DL (ref 31.5–35.7)
MCV RBC AUTO: 91.5 FL (ref 79–97)
MICROALBUMIN/CREAT UR: 27.6 MG/G (ref 0–29)
MONOCYTES # BLD AUTO: 0.68 10*3/MM3 (ref 0.1–0.9)
MONOCYTES NFR BLD AUTO: 7.3 % (ref 5–12)
NEUTROPHILS NFR BLD AUTO: 5.64 10*3/MM3 (ref 1.7–7)
NEUTROPHILS NFR BLD AUTO: 60.5 % (ref 42.7–76)
NRBC BLD AUTO-RTO: 0 /100 WBC (ref 0–0.2)
PLATELET # BLD AUTO: 270 10*3/MM3 (ref 140–450)
PMV BLD AUTO: 10.6 FL (ref 6–12)
POTASSIUM SERPL-SCNC: 3.6 MMOL/L (ref 3.5–5.2)
PROT SERPL-MCNC: 6.8 G/DL (ref 6–8.5)
RBC # BLD AUTO: 4.49 10*6/MM3 (ref 3.77–5.28)
SODIUM SERPL-SCNC: 139 MMOL/L (ref 136–145)
T4 FREE SERPL-MCNC: 1.4 NG/DL (ref 0.93–1.7)
TRIGL SERPL-MCNC: 89 MG/DL (ref 0–150)
TSH SERPL DL<=0.05 MIU/L-ACNC: 3.51 UIU/ML (ref 0.27–4.2)
VLDLC SERPL-MCNC: 16 MG/DL (ref 5–40)
WBC NRBC COR # BLD: 9.33 10*3/MM3 (ref 3.4–10.8)

## 2023-11-09 PROCEDURE — 84439 ASSAY OF FREE THYROXINE: CPT

## 2023-11-09 PROCEDURE — 80053 COMPREHEN METABOLIC PANEL: CPT

## 2023-11-09 PROCEDURE — 82043 UR ALBUMIN QUANTITATIVE: CPT

## 2023-11-09 PROCEDURE — 85025 COMPLETE CBC W/AUTO DIFF WBC: CPT

## 2023-11-09 PROCEDURE — 83036 HEMOGLOBIN GLYCOSYLATED A1C: CPT

## 2023-11-09 PROCEDURE — 80061 LIPID PANEL: CPT

## 2023-11-09 PROCEDURE — 84443 ASSAY THYROID STIM HORMONE: CPT

## 2023-11-09 PROCEDURE — 36415 COLL VENOUS BLD VENIPUNCTURE: CPT

## 2023-11-09 PROCEDURE — 82570 ASSAY OF URINE CREATININE: CPT

## 2023-11-16 ENCOUNTER — TELEPHONE (OUTPATIENT)
Dept: INTERNAL MEDICINE | Facility: CLINIC | Age: 88
End: 2023-11-16

## 2023-11-16 ENCOUNTER — OFFICE VISIT (OUTPATIENT)
Dept: INTERNAL MEDICINE | Facility: CLINIC | Age: 88
End: 2023-11-16
Payer: MEDICARE

## 2023-11-16 VITALS
WEIGHT: 114 LBS | HEART RATE: 106 BPM | BODY MASS INDEX: 20.2 KG/M2 | HEIGHT: 63 IN | OXYGEN SATURATION: 94 % | DIASTOLIC BLOOD PRESSURE: 77 MMHG | SYSTOLIC BLOOD PRESSURE: 131 MMHG | TEMPERATURE: 98.2 F

## 2023-11-16 DIAGNOSIS — I10 HYPERTENSION, ESSENTIAL: ICD-10-CM

## 2023-11-16 DIAGNOSIS — Z00.00 MEDICARE ANNUAL WELLNESS VISIT, SUBSEQUENT: Primary | ICD-10-CM

## 2023-11-16 DIAGNOSIS — J43.2 CENTRILOBULAR EMPHYSEMA: ICD-10-CM

## 2023-11-16 DIAGNOSIS — F41.1 ANXIETY, GENERALIZED: ICD-10-CM

## 2023-11-16 DIAGNOSIS — F33.1 MAJOR DEPRESSIVE DISORDER, RECURRENT, MODERATE: ICD-10-CM

## 2023-11-16 DIAGNOSIS — Z23 NEED FOR VACCINATION: ICD-10-CM

## 2023-11-16 DIAGNOSIS — I63.312 CEREBROVASCULAR ACCIDENT (CVA) DUE TO THROMBOSIS OF LEFT MIDDLE CEREBRAL ARTERY: ICD-10-CM

## 2023-11-16 DIAGNOSIS — E11.9 TYPE 2 DIABETES MELLITUS WITHOUT COMPLICATION, WITHOUT LONG-TERM CURRENT USE OF INSULIN: ICD-10-CM

## 2023-11-16 RX ORDER — ROSUVASTATIN CALCIUM 5 MG/1
5 TABLET, COATED ORAL
Qty: 90 TABLET | Refills: 1 | Status: SHIPPED | OUTPATIENT
Start: 2023-11-16

## 2023-11-16 NOTE — TELEPHONE ENCOUNTER
Rx Refill Note  Requested Prescriptions     Pending Prescriptions Disp Refills    rosuvastatin (CRESTOR) 5 MG tablet 90 tablet 1     Sig: Take 1 tablet by mouth every night at bedtime.      Last office visit with prescribing clinician: 11/16/2023   Last telemedicine visit with prescribing clinician: Visit date not found   Next office visit with prescribing clinician: 5/16/2024                         Would you like a call back once the refill request has been completed: [] Yes [] No    If the office needs to give you a call back, can they leave a voicemail: [] Yes [] No    Vita Downing MA  11/16/23, 13:33 EST

## 2023-11-16 NOTE — PROGRESS NOTES
The ABCs of the Annual Wellness Visit  Subsequent Medicare Wellness Visit    Subjective      Sugar Lee is a 89 y.o. female who presents for a Subsequent Medicare Wellness Visit.    The following portions of the patient's history were reviewed and   updated as appropriate: allergies, current medications, past family history, past medical history, past social history, past surgical history, and problem list.    Compared to one year ago, the patient feels her physical   health is worse.    Compared to one year ago, the patient feels her mental   health is the same.    Recent Hospitalizations:  She was not admitted to the hospital during the last year.       Current Medical Providers:  Patient Care Team:  Alpesh Oliveira MD as PCP - General (Internal Medicine)    Outpatient Medications Prior to Visit   Medication Sig Dispense Refill    cilostazol (PLETAL) 50 MG tablet TAKE 2 TABLETS BY MOUTH TWICE DAILY 120 tablet 5    levothyroxine (SYNTHROID, LEVOTHROID) 50 MCG tablet Take 1 tablet by mouth Daily.      mirtazapine (REMERON) 7.5 MG tablet TAKE 1 TABLET BY MOUTH EVERY NIGHT AT BEDTIME. 30 tablet 4    rosuvastatin (CRESTOR) 5 MG tablet TAKE 1 TABLET BY MOUTH EVERY NIGHT AT BEDTIME. 90 tablet 2    spironolactone-hydrochlorothiazide (ALDACTAZIDE) 25-25 MG tablet Take 0.5 tablets by mouth Daily. 45 tablet 4    TiZANidine (Zanaflex) 2 MG capsule Take 1 capsule by mouth At Night As Needed for Muscle Spasms. 30 capsule 0     No facility-administered medications prior to visit.       No opioid medication identified on active medication list. I have reviewed chart for other potential  high risk medication/s and harmful drug interactions in the elderly.        Aspirin is not on active medication list.  Aspirin use is not indicated based on review of current medical condition/s. Risk of harm outweighs potential benefits.  .    Patient Active Problem List   Diagnosis    Hypertension, essential    Centrilobular emphysema  "   Anxiety, generalized    Major depressive disorder, recurrent, moderate    Diabetes 1.5, managed as type 2    Cerebrovascular accident (CVA) due to thrombosis of left middle cerebral artery    Acute neck pain    Type 2 diabetes mellitus without complication, without long-term current use of insulin    Chronic pain of right ankle    Medicare annual wellness visit, subsequent     Advance Care Planning   Advance Care Planning     Advance Directive is not on file.  ACP discussion was held with the patient during this visit. Patient has an advance directive (not in EMR), copy requested.     Objective    Vitals:    23 1256   BP: 131/77   Pulse: 106   Temp: 98.2 °F (36.8 °C)   SpO2: 94%   Weight: 51.7 kg (114 lb)   Height: 160 cm (62.99\")     Estimated body mass index is 20.2 kg/m² as calculated from the following:    Height as of this encounter: 160 cm (62.99\").    Weight as of this encounter: 51.7 kg (114 lb).    BMI is within normal parameters. No other follow-up for BMI required.      Does the patient have evidence of cognitive impairment?   Yes: Follow up in 6month.    Lab Results   Component Value Date    TRIG 89 2023    HDL 53 2023     (H) 2023    VLDL 16 2023    HGBA1C 6.30 (H) 2023          HEALTH RISK ASSESSMENT    Smoking Status:  Social History     Tobacco Use   Smoking Status Every Day    Packs/day: .5    Types: Cigarettes   Smokeless Tobacco Never     Alcohol Consumption:  Social History     Substance and Sexual Activity   Alcohol Use Never     Fall Risk Screen:    STEADI Fall Risk Assessment was completed, and patient is at LOW risk for falls.Assessment completed on:2023    Depression Screenin/16/2023     1:47 PM   PHQ-2/PHQ-9 Depression Screening   Little Interest or Pleasure in Doing Things 0-->not at all   Feeling Down, Depressed or Hopeless 0-->not at all   PHQ-9: Brief Depression Severity Measure Score 0       Health Habits and Functional and " Cognitive Screenin/16/2023     1:00 PM   Functional & Cognitive Status   Do you have difficulty preparing food and eating? No   Do you have difficulty bathing yourself, getting dressed or grooming yourself? No   Do you have difficulty using the toilet? No   Do you have difficulty moving around from place to place? No   Do you have trouble with steps or getting out of a bed or a chair? Yes   Do you need help using the phone?  No   Are you deaf or do you have serious difficulty hearing?  Yes   Do you need help to go to places out of walking distance? Yes   Do you need help shopping? Yes   Do you need help preparing meals?  No   Do you need help with housework?  No   Do you need help with laundry? No   Do you need help taking your medications? No   Do you need help managing money? No   Do you ever drive or ride in a car without wearing a seat belt? No   Do you have difficulty concentrating, remembering or making decisions? Yes       Age-appropriate Screening Schedule:  Refer to the list below for future screening recommendations based on patient's age, sex and/or medical conditions. Orders for these recommended tests are listed in the plan section. The patient has been provided with a written plan.    Health Maintenance   Topic Date Due    TDAP/TD VACCINES (1 - Tdap) Never done    ZOSTER VACCINE (1 of 2) Never done    Pneumococcal Vaccine 65+ (2 - PCV) 2014    DXA SCAN  2015    DIABETIC EYE EXAM  2021    INFLUENZA VACCINE  2023    COVID-19 Vaccine ( season) 2023    HEMOGLOBIN A1C  2024    LIPID PANEL  2024    URINE MICROALBUMIN  2024    ANNUAL WELLNESS VISIT  2024                  CMS Preventative Services Quick Reference  Risk Factors Identified During Encounter:    Fall Risk-High or Moderate: Discussed Fall Prevention in the home  Inactivity/Sedentary: Patient was advised to exercise at least 150 minutes a week per CDC recommendations. and  patient's to walk as much as possible in the home,  Tobacco Use/Dependance Risk patient was encouraged to quit smoking,    The above risks/problems have been discussed with the patient.  Pertinent information has been shared with the patient in the After Visit Summary.    Diagnoses and all orders for this visit:    1. Medicare annual wellness visit, subsequent (Primary)  -     CBC & Differential; Future  -     Hemoglobin A1c; Future  -     Comprehensive Metabolic Panel; Future  -     TSH+Free T4; Future  -     Lipid Panel; Future    2. Major depressive disorder, recurrent, moderate  -     CBC & Differential; Future  -     Hemoglobin A1c; Future  -     Comprehensive Metabolic Panel; Future  -     TSH+Free T4; Future  -     Lipid Panel; Future    3. Hypertension, essential  -     CBC & Differential; Future  -     Hemoglobin A1c; Future  -     Comprehensive Metabolic Panel; Future  -     TSH+Free T4; Future  -     Lipid Panel; Future    4. Anxiety, generalized  -     CBC & Differential; Future  -     Hemoglobin A1c; Future  -     Comprehensive Metabolic Panel; Future  -     TSH+Free T4; Future  -     Lipid Panel; Future    5. Type 2 diabetes mellitus without complication, without long-term current use of insulin  -     CBC & Differential; Future  -     Hemoglobin A1c; Future  -     Comprehensive Metabolic Panel; Future  -     TSH+Free T4; Future  -     Lipid Panel; Future    6. Cerebrovascular accident (CVA) due to thrombosis of left middle cerebral artery  -     CBC & Differential; Future  -     Hemoglobin A1c; Future  -     Comprehensive Metabolic Panel; Future  -     TSH+Free T4; Future  -     Lipid Panel; Future    7. Centrilobular emphysema  -     CBC & Differential; Future  -     Hemoglobin A1c; Future  -     Comprehensive Metabolic Panel; Future  -     TSH+Free T4; Future  -     Lipid Panel; Future        Follow Up:   Next Medicare Wellness visit to be scheduled in 1 year.      An After Visit Summary and PPPS  were made available to the patient.

## 2023-11-16 NOTE — PROGRESS NOTES
"CHIEF COMPLAINT/ HPI:  Hypertension (Routine follow up. Lab follow up. Flu shot request. )    Patient here for routine follow-up with her  COPD, gait dysfunction weakness, she says she is doing okay otherwise she is here with her daughter today.          Objective   Vital Signs  Vitals:    11/16/23 1256   BP: 131/77   Pulse: 106   Temp: 98.2 °F (36.8 °C)   SpO2: 94%   Weight: 51.7 kg (114 lb)   Height: 160 cm (62.99\")      Body mass index is 20.2 kg/m².  Review of Systems   Constitutional:  Positive for fatigue.   HENT: Negative.     Eyes: Negative.    Respiratory:  Positive for shortness of breath.    Cardiovascular: Negative.    Gastrointestinal: Negative.    Endocrine: Negative.    Genitourinary: Negative.    Musculoskeletal: Negative.    Allergic/Immunologic: Negative.    Neurological:  Positive for weakness.   Hematological: Negative.    Psychiatric/Behavioral: Negative.        Physical Exam  Constitutional:       General: She is not in acute distress.     Appearance: Normal appearance.   HENT:      Head: Normocephalic.      Mouth/Throat:      Mouth: Mucous membranes are moist.   Eyes:      Conjunctiva/sclera: Conjunctivae normal.      Pupils: Pupils are equal, round, and reactive to light.   Cardiovascular:      Rate and Rhythm: Normal rate. Rhythm irregular.      Pulses: Normal pulses.      Heart sounds: Normal heart sounds.   Pulmonary:      Breath sounds: Rhonchi present.   Abdominal:      General: Bowel sounds are normal.      Palpations: Abdomen is soft.   Musculoskeletal:         General: No swelling. Normal range of motion.      Cervical back: Neck supple.   Skin:     General: Skin is warm and dry.      Coloration: Skin is not jaundiced.   Neurological:      General: No focal deficit present.      Mental Status: She is alert and oriented to person, place, and time. Mental status is at baseline.   Psychiatric:         Mood and Affect: Mood normal.         Behavior: Behavior normal.         Thought " "Content: Thought content normal.         Judgment: Judgment normal.        Result Review :   No results found for: \"PROBNP\", \"BNP\"  CMP          11/22/2022    14:29 5/11/2023    10:49 11/9/2023    09:39   CMP   Glucose 83  93  127    BUN 18  19  19    Creatinine 0.90  1.04  1.03    EGFR 60.9  51.2  51.4    Sodium 140  141  139    Potassium 3.7  3.8  3.6    Chloride 103  100  103    Calcium 9.5  10.7  9.5    Total Protein 6.4  7.4  6.8    Albumin 4.00  4.3  3.9    Globulin 2.4  3.1  2.9    Total Bilirubin 0.3  0.4  0.5    Alkaline Phosphatase 81  84  77    AST (SGOT) 24  15  22    ALT (SGPT) 9  12  9    Albumin/Globulin Ratio 1.7  1.4  1.3    BUN/Creatinine Ratio 20.0  18.3  18.4    Anion Gap 7.0  12.5  8.7      CBC w/diff          11/22/2022    14:29 5/11/2023    10:49 11/9/2023    09:39   CBC w/Diff   WBC 10.06  9.30  9.33    RBC 4.12  4.76  4.49    Hemoglobin 12.0  14.5  13.4    Hematocrit 37.7  43.0  41.1    MCV 91.5  90.3  91.5    MCH 29.1  30.5  29.8    MCHC 31.8  33.7  32.6    RDW 12.3  11.8  11.9    Platelets 326  241  270    Neutrophil Rel % 64.2  64.2  60.5    Immature Granulocyte Rel % 0.5  0.4  0.5    Lymphocyte Rel % 25.9  25.5  28.7    Monocyte Rel % 7.1  7.1  7.3    Eosinophil Rel % 1.5  1.5  2.1    Basophil Rel % 0.8  1.3  0.9       Lipid Panel          11/22/2022    14:29 11/9/2023    09:39   Lipid Panel   Total Cholesterol 152  201    Triglycerides 71  89    HDL Cholesterol 54  53    VLDL Cholesterol 14  16    LDL Cholesterol  84  132    LDL/HDL Ratio 1.55  2.46       Lab Results   Component Value Date    TSH 3.510 11/09/2023    TSH 3.900 05/11/2023    TSH 3.530 11/22/2022      Lab Results   Component Value Date    FREET4 1.40 11/09/2023    FREET4 1.52 05/11/2023    FREET4 1.28 11/22/2022      A1C Last 3 Results          11/22/2022    14:29 5/11/2023    10:49 11/9/2023    09:39   HGBA1C Last 3 Results   Hemoglobin A1C 6.20  6.50  6.30                        Visit Diagnoses:    ICD-10-CM ICD-9-CM "   1. Medicare annual wellness visit, subsequent  Z00.00 V70.0   2. Major depressive disorder, recurrent, moderate  F33.1 296.32   3. Hypertension, essential  I10 401.9   4. Anxiety, generalized  F41.1 300.02   5. Type 2 diabetes mellitus without complication, without long-term current use of insulin  E11.9 250.00   6. Cerebrovascular accident (CVA) due to thrombosis of left middle cerebral artery  I63.312 434.01   7. Centrilobular emphysema  J43.2 492.8       Assessment and Plan   Diagnoses and all orders for this visit:    1. Medicare annual wellness visit, subsequent (Primary)  -     CBC & Differential; Future  -     Hemoglobin A1c; Future  -     Comprehensive Metabolic Panel; Future  -     TSH+Free T4; Future  -     Lipid Panel; Future    2. Major depressive disorder, recurrent, moderate  -     CBC & Differential; Future  -     Hemoglobin A1c; Future  -     Comprehensive Metabolic Panel; Future  -     TSH+Free T4; Future  -     Lipid Panel; Future    3. Hypertension, essential  -     CBC & Differential; Future  -     Hemoglobin A1c; Future  -     Comprehensive Metabolic Panel; Future  -     TSH+Free T4; Future  -     Lipid Panel; Future    4. Anxiety, generalized  -     CBC & Differential; Future  -     Hemoglobin A1c; Future  -     Comprehensive Metabolic Panel; Future  -     TSH+Free T4; Future  -     Lipid Panel; Future    5. Type 2 diabetes mellitus without complication, without long-term current use of insulin  -     CBC & Differential; Future  -     Hemoglobin A1c; Future  -     Comprehensive Metabolic Panel; Future  -     TSH+Free T4; Future  -     Lipid Panel; Future    6. Cerebrovascular accident (CVA) due to thrombosis of left middle cerebral artery  -     CBC & Differential; Future  -     Hemoglobin A1c; Future  -     Comprehensive Metabolic Panel; Future  -     TSH+Free T4; Future  -     Lipid Panel; Future    7. Centrilobular emphysema  -     CBC & Differential; Future  -     Hemoglobin A1c;  Future  -     Comprehensive Metabolic Panel; Future  -     TSH+Free T4; Future  -     Lipid Panel; Future    Generalized tremors upper and lower extremities body, not consistent with Parkinson's disease, patient is still ambulatory most likely essential tremors, discussed treatment options side effects of medications November 2023    BMI is within normal parameters. No other follow-up for BMI required.     Claudication lower legs, ----- severe arterial insufficiency left greater than right, has followed up with vascular surgery in the past,, continues rosuvastatin,, Pletal     Anxiety depression, continues mirtazapine, 7.5 mg nightly     Hearing deficit, uses hearing aids     Memory deficits, continues over-the-counter Prevagen     Left brain stroke left thalamus left internal capsule May 2014 recommended baby aspirin daily,     Hypertension continues Aldactazide 25/25 mg--- half tablet daily     COPD, discussed quitting tobacco, again May 2023     Hypothyroid ----previous left thyroidectomy, continues  Synthroid 0.05 mg daily,      Osteopenia remotely diagnosed,     Diabetes type 2 --- 6.3, November 2023--- no medications      Cervical dystonia, pain, continues Zanaflex 2 mg nightly as needed        Follow Up   No follow-ups on file.  Patient was given instructions and counseling regarding her condition or for health maintenance advice. Please see specific information pulled into the AVS if appropriate.

## 2023-12-01 RX ORDER — LEVOTHYROXINE SODIUM 0.05 MG/1
50 TABLET ORAL DAILY
Qty: 90 TABLET | Refills: 3 | Status: SHIPPED | OUTPATIENT
Start: 2023-12-01

## 2023-12-01 NOTE — TELEPHONE ENCOUNTER
Caller: VERNELL WALKER    Relationship: DAUGHTER    Best call back number: [phone]    Requested Prescriptions:   Requested Prescriptions     Pending Prescriptions Disp Refills    levothyroxine (SYNTHROID, LEVOTHROID) 50 MCG tablet       Sig: Take 1 tablet by mouth Daily.        Pharmacy where request should be sent: [address] [phone] [facility] [phone] [facility]     Last office visit with prescribing clinician: 11/16/2023   Last telemedicine visit with prescribing clinician: Visit date not found   Next office visit with prescribing clinician: 5/16/2024     Additional details provided by patient: VERNELL STATED THAT IT WAS DISCUSSED AT LAST APPOINTMENT THAT DR YOUNG TAKE OVER PRESCRIBING THIS MEDICATION.     Does the patient have less than a 3 day supply:  [x] Yes  [] No    Betty Newman Rep   12/01/23 14:21 EST

## 2024-05-09 ENCOUNTER — LAB (OUTPATIENT)
Dept: LAB | Facility: HOSPITAL | Age: 89
End: 2024-05-09
Payer: MEDICARE

## 2024-05-09 DIAGNOSIS — I10 HYPERTENSION, ESSENTIAL: ICD-10-CM

## 2024-05-09 DIAGNOSIS — E11.9 TYPE 2 DIABETES MELLITUS WITHOUT COMPLICATION, WITHOUT LONG-TERM CURRENT USE OF INSULIN: ICD-10-CM

## 2024-05-09 DIAGNOSIS — F33.1 MAJOR DEPRESSIVE DISORDER, RECURRENT, MODERATE: ICD-10-CM

## 2024-05-09 DIAGNOSIS — Z00.00 MEDICARE ANNUAL WELLNESS VISIT, SUBSEQUENT: ICD-10-CM

## 2024-05-09 DIAGNOSIS — J43.2 CENTRILOBULAR EMPHYSEMA: ICD-10-CM

## 2024-05-09 DIAGNOSIS — I63.312 CEREBROVASCULAR ACCIDENT (CVA) DUE TO THROMBOSIS OF LEFT MIDDLE CEREBRAL ARTERY: ICD-10-CM

## 2024-05-09 DIAGNOSIS — F41.1 ANXIETY, GENERALIZED: ICD-10-CM

## 2024-05-09 LAB
ALBUMIN SERPL-MCNC: 3.7 G/DL (ref 3.5–5.2)
ALBUMIN/GLOB SERPL: 1.3 G/DL
ALP SERPL-CCNC: 108 U/L (ref 39–117)
ALT SERPL W P-5'-P-CCNC: 32 U/L (ref 1–33)
ANION GAP SERPL CALCULATED.3IONS-SCNC: 10.8 MMOL/L (ref 5–15)
AST SERPL-CCNC: 26 U/L (ref 1–32)
BASOPHILS # BLD AUTO: 0.07 10*3/MM3 (ref 0–0.2)
BASOPHILS NFR BLD AUTO: 0.8 % (ref 0–1.5)
BILIRUB SERPL-MCNC: 0.7 MG/DL (ref 0–1.2)
BUN SERPL-MCNC: 16 MG/DL (ref 8–23)
BUN/CREAT SERPL: 16.8 (ref 7–25)
CALCIUM SPEC-SCNC: 9.5 MG/DL (ref 8.2–9.6)
CHLORIDE SERPL-SCNC: 102 MMOL/L (ref 98–107)
CHOLEST SERPL-MCNC: 162 MG/DL (ref 0–200)
CO2 SERPL-SCNC: 29.2 MMOL/L (ref 22–29)
CREAT SERPL-MCNC: 0.95 MG/DL (ref 0.57–1)
DEPRECATED RDW RBC AUTO: 42.8 FL (ref 37–54)
EGFRCR SERPLBLD CKD-EPI 2021: 56.7 ML/MIN/1.73
EOSINOPHIL # BLD AUTO: 0.16 10*3/MM3 (ref 0–0.4)
EOSINOPHIL NFR BLD AUTO: 1.8 % (ref 0.3–6.2)
ERYTHROCYTE [DISTWIDTH] IN BLOOD BY AUTOMATED COUNT: 12.8 % (ref 12.3–15.4)
GLOBULIN UR ELPH-MCNC: 2.8 GM/DL
GLUCOSE SERPL-MCNC: 107 MG/DL (ref 65–99)
HBA1C MFR BLD: 5.9 % (ref 4.8–5.6)
HCT VFR BLD AUTO: 40.3 % (ref 34–46.6)
HDLC SERPL-MCNC: 51 MG/DL (ref 40–60)
HGB BLD-MCNC: 12.8 G/DL (ref 12–15.9)
IMM GRANULOCYTES # BLD AUTO: 0.03 10*3/MM3 (ref 0–0.05)
IMM GRANULOCYTES NFR BLD AUTO: 0.3 % (ref 0–0.5)
LDLC SERPL CALC-MCNC: 97 MG/DL (ref 0–100)
LDLC/HDLC SERPL: 1.9 {RATIO}
LYMPHOCYTES # BLD AUTO: 3.09 10*3/MM3 (ref 0.7–3.1)
LYMPHOCYTES NFR BLD AUTO: 35.6 % (ref 19.6–45.3)
MCH RBC QN AUTO: 29.2 PG (ref 26.6–33)
MCHC RBC AUTO-ENTMCNC: 31.8 G/DL (ref 31.5–35.7)
MCV RBC AUTO: 91.8 FL (ref 79–97)
MONOCYTES # BLD AUTO: 0.7 10*3/MM3 (ref 0.1–0.9)
MONOCYTES NFR BLD AUTO: 8.1 % (ref 5–12)
NEUTROPHILS NFR BLD AUTO: 4.64 10*3/MM3 (ref 1.7–7)
NEUTROPHILS NFR BLD AUTO: 53.4 % (ref 42.7–76)
NRBC BLD AUTO-RTO: 0 /100 WBC (ref 0–0.2)
PLATELET # BLD AUTO: 242 10*3/MM3 (ref 140–450)
PMV BLD AUTO: 10.9 FL (ref 6–12)
POTASSIUM SERPL-SCNC: 3.6 MMOL/L (ref 3.5–5.2)
PROT SERPL-MCNC: 6.5 G/DL (ref 6–8.5)
RBC # BLD AUTO: 4.39 10*6/MM3 (ref 3.77–5.28)
SODIUM SERPL-SCNC: 142 MMOL/L (ref 136–145)
T4 FREE SERPL-MCNC: 1.23 NG/DL (ref 0.93–1.7)
TRIGL SERPL-MCNC: 70 MG/DL (ref 0–150)
TSH SERPL DL<=0.05 MIU/L-ACNC: 4.17 UIU/ML (ref 0.27–4.2)
VLDLC SERPL-MCNC: 14 MG/DL (ref 5–40)
WBC NRBC COR # BLD AUTO: 8.69 10*3/MM3 (ref 3.4–10.8)

## 2024-05-09 PROCEDURE — 83036 HEMOGLOBIN GLYCOSYLATED A1C: CPT

## 2024-05-09 PROCEDURE — 36415 COLL VENOUS BLD VENIPUNCTURE: CPT

## 2024-05-09 PROCEDURE — 80053 COMPREHEN METABOLIC PANEL: CPT

## 2024-05-09 PROCEDURE — 84443 ASSAY THYROID STIM HORMONE: CPT

## 2024-05-09 PROCEDURE — 85025 COMPLETE CBC W/AUTO DIFF WBC: CPT

## 2024-05-09 PROCEDURE — 80061 LIPID PANEL: CPT

## 2024-05-09 PROCEDURE — 84439 ASSAY OF FREE THYROXINE: CPT

## 2024-05-16 ENCOUNTER — OFFICE VISIT (OUTPATIENT)
Dept: INTERNAL MEDICINE | Age: 89
End: 2024-05-16
Payer: MEDICARE

## 2024-05-16 VITALS
DIASTOLIC BLOOD PRESSURE: 82 MMHG | OXYGEN SATURATION: 99 % | WEIGHT: 113 LBS | BODY MASS INDEX: 20.02 KG/M2 | HEIGHT: 63 IN | HEART RATE: 81 BPM | TEMPERATURE: 97.8 F | SYSTOLIC BLOOD PRESSURE: 134 MMHG

## 2024-05-16 DIAGNOSIS — G25.0 ESSENTIAL TREMOR: ICD-10-CM

## 2024-05-16 DIAGNOSIS — F33.1 MAJOR DEPRESSIVE DISORDER, RECURRENT, MODERATE: ICD-10-CM

## 2024-05-16 DIAGNOSIS — R22.31 NODULE OF SKIN OF RIGHT THUMB: ICD-10-CM

## 2024-05-16 DIAGNOSIS — I10 HYPERTENSION, ESSENTIAL: ICD-10-CM

## 2024-05-16 DIAGNOSIS — I63.312 CEREBROVASCULAR ACCIDENT (CVA) DUE TO THROMBOSIS OF LEFT MIDDLE CEREBRAL ARTERY: ICD-10-CM

## 2024-05-16 DIAGNOSIS — E11.9 TYPE 2 DIABETES MELLITUS WITHOUT COMPLICATION, WITHOUT LONG-TERM CURRENT USE OF INSULIN: ICD-10-CM

## 2024-05-16 DIAGNOSIS — M62.81 ABNORMAL GAIT DUE TO MUSCLE WEAKNESS: ICD-10-CM

## 2024-05-16 DIAGNOSIS — E03.8 HYPOTHYROIDISM DUE TO HASHIMOTO'S THYROIDITIS: ICD-10-CM

## 2024-05-16 DIAGNOSIS — F41.1 ANXIETY, GENERALIZED: ICD-10-CM

## 2024-05-16 DIAGNOSIS — E06.3 HYPOTHYROIDISM DUE TO HASHIMOTO'S THYROIDITIS: ICD-10-CM

## 2024-05-16 DIAGNOSIS — J43.2 CENTRILOBULAR EMPHYSEMA: Primary | ICD-10-CM

## 2024-05-16 DIAGNOSIS — I73.9 PAD (PERIPHERAL ARTERY DISEASE): ICD-10-CM

## 2024-05-16 DIAGNOSIS — R26.9 ABNORMAL GAIT DUE TO MUSCLE WEAKNESS: ICD-10-CM

## 2024-05-16 PROCEDURE — 1170F FXNL STATUS ASSESSED: CPT | Performed by: INTERNAL MEDICINE

## 2024-05-16 PROCEDURE — 99214 OFFICE O/P EST MOD 30 MIN: CPT | Performed by: INTERNAL MEDICINE

## 2024-05-16 PROCEDURE — G2211 COMPLEX E/M VISIT ADD ON: HCPCS | Performed by: INTERNAL MEDICINE

## 2024-05-16 RX ORDER — PRIMIDONE 50 MG/1
50 TABLET ORAL 2 TIMES DAILY
Qty: 60 TABLET | Refills: 5 | Status: SHIPPED | OUTPATIENT
Start: 2024-05-16

## 2024-05-16 NOTE — Clinical Note
Pt's Referral for Ortho is ready for you to sign, Thanks! It is pended on this office note, you have to open the visit.

## 2024-05-16 NOTE — PROGRESS NOTES
"CHIEF COMPLAINT/ HPI:  Hypertension (Routine follow up, lab follow up. Rt thumb nodule. Rt Ankle swelling. )              Objective   Vital Signs  Vitals:    05/16/24 1318   BP: 134/82   Pulse: 81   Temp: 97.8 °F (36.6 °C)   SpO2: 99%   Weight: 51.3 kg (113 lb)   Height: 160 cm (62.99\")      Body mass index is 20.02 kg/m².  Review of Systems   Constitutional: Negative.    HENT: Negative.     Eyes: Negative.    Respiratory: Negative.     Cardiovascular: Negative.    Gastrointestinal: Negative.    Endocrine: Negative.    Genitourinary: Negative.    Musculoskeletal: Negative.    Allergic/Immunologic: Negative.    Neurological: Negative.  Positive for weakness.   Hematological: Negative.    Psychiatric/Behavioral: Negative.        Physical Exam  Constitutional:       General: She is not in acute distress.     Appearance: Normal appearance.   HENT:      Head: Normocephalic.      Mouth/Throat:      Mouth: Mucous membranes are moist.   Eyes:      Conjunctiva/sclera: Conjunctivae normal.      Pupils: Pupils are equal, round, and reactive to light.   Cardiovascular:      Rate and Rhythm: Normal rate and regular rhythm.      Pulses: Normal pulses.   Pulmonary:      Effort: Pulmonary effort is normal.   Abdominal:      General: Abdomen is flat. Bowel sounds are normal.      Palpations: Abdomen is soft.   Musculoskeletal:         General: No swelling. Normal range of motion.      Cervical back: Neck supple.   Skin:     General: Skin is warm and dry.      Coloration: Skin is not jaundiced.   Neurological:      General: No focal deficit present.      Mental Status: She is alert and oriented to person, place, and time. Mental status is at baseline.   Psychiatric:         Mood and Affect: Mood normal.         Behavior: Behavior normal.         Thought Content: Thought content normal.         Judgment: Judgment normal.        Result Review :   No results found for: \"PROBNP\", \"BNP\"  CMP          11/9/2023    09:39 5/9/2024    09:37 "   CMP   Glucose 127  107    BUN 19  16    Creatinine 1.03  0.95    EGFR 51.4  56.7    Sodium 139  142    Potassium 3.6  3.6    Chloride 103  102    Calcium 9.5  9.5    Total Protein 6.8  6.5    Albumin 3.9  3.7    Globulin 2.9  2.8    Total Bilirubin 0.5  0.7    Alkaline Phosphatase 77  108    AST (SGOT) 22  26    ALT (SGPT) 9  32    Albumin/Globulin Ratio 1.3  1.3    BUN/Creatinine Ratio 18.4  16.8    Anion Gap 8.7  10.8      CBC w/diff          11/9/2023    09:39 5/9/2024    09:37   CBC w/Diff   WBC 9.33  8.69    RBC 4.49  4.39    Hemoglobin 13.4  12.8    Hematocrit 41.1  40.3    MCV 91.5  91.8    MCH 29.8  29.2    MCHC 32.6  31.8    RDW 11.9  12.8    Platelets 270  242    Neutrophil Rel % 60.5  53.4    Immature Granulocyte Rel % 0.5  0.3    Lymphocyte Rel % 28.7  35.6    Monocyte Rel % 7.3  8.1    Eosinophil Rel % 2.1  1.8    Basophil Rel % 0.9  0.8       Lipid Panel          11/9/2023    09:39 5/9/2024    09:37   Lipid Panel   Total Cholesterol 201  162    Triglycerides 89  70    HDL Cholesterol 53  51    VLDL Cholesterol 16  14    LDL Cholesterol  132  97    LDL/HDL Ratio 2.46  1.90       Lab Results   Component Value Date    TSH 4.170 05/09/2024    TSH 3.510 11/09/2023    TSH 3.900 05/11/2023      Lab Results   Component Value Date    FREET4 1.23 05/09/2024    FREET4 1.40 11/09/2023    FREET4 1.52 05/11/2023      A1C Last 3 Results          11/9/2023    09:39 5/9/2024    09:37   HGBA1C Last 3 Results   Hemoglobin A1C 6.30  5.90                        Visit Diagnoses:    ICD-10-CM ICD-9-CM   1. Centrilobular emphysema  J43.2 492.8   2. Hypertension, essential  I10 401.9   3. Anxiety, generalized  F41.1 300.02   4. Major depressive disorder, recurrent, moderate  F33.1 296.32   5. Cerebrovascular accident (CVA) due to thrombosis of left middle cerebral artery  I63.312 434.01   6. Essential tremor  G25.0 333.1   7. Hypothyroidism due to Hashimoto's thyroiditis  E03.8 244.8    E06.3 245.2   8. Type 2 diabetes  mellitus without complication, without long-term current use of insulin  E11.9 250.00   9. PAD (peripheral artery disease)  I73.9 443.9       Assessment and Plan   Diagnoses and all orders for this visit:    1. Centrilobular emphysema (Primary)  -     CBC & Differential; Future  -     Comprehensive Metabolic Panel; Future  -     TSH+Free T4; Future  -     Hemoglobin A1c; Future  -     MicroAlbumin, Urine, Random - Urine, Clean Catch; Future    2. Hypertension, essential  -     CBC & Differential; Future  -     Comprehensive Metabolic Panel; Future  -     TSH+Free T4; Future  -     Hemoglobin A1c; Future  -     MicroAlbumin, Urine, Random - Urine, Clean Catch; Future    3. Anxiety, generalized  -     CBC & Differential; Future  -     Comprehensive Metabolic Panel; Future  -     TSH+Free T4; Future  -     Hemoglobin A1c; Future  -     MicroAlbumin, Urine, Random - Urine, Clean Catch; Future    4. Major depressive disorder, recurrent, moderate  -     CBC & Differential; Future  -     Comprehensive Metabolic Panel; Future  -     TSH+Free T4; Future  -     Hemoglobin A1c; Future  -     MicroAlbumin, Urine, Random - Urine, Clean Catch; Future    5. Cerebrovascular accident (CVA) due to thrombosis of left middle cerebral artery  -     CBC & Differential; Future  -     Comprehensive Metabolic Panel; Future  -     TSH+Free T4; Future  -     Hemoglobin A1c; Future  -     MicroAlbumin, Urine, Random - Urine, Clean Catch; Future    6. Essential tremor  -     CBC & Differential; Future  -     Comprehensive Metabolic Panel; Future  -     TSH+Free T4; Future  -     Hemoglobin A1c; Future  -     MicroAlbumin, Urine, Random - Urine, Clean Catch; Future    7. Hypothyroidism due to Hashimoto's thyroiditis  -     CBC & Differential; Future  -     Comprehensive Metabolic Panel; Future  -     TSH+Free T4; Future  -     Hemoglobin A1c; Future  -     MicroAlbumin, Urine, Random - Urine, Clean Catch; Future    8. Type 2 diabetes mellitus  without complication, without long-term current use of insulin  -     CBC & Differential; Future  -     Comprehensive Metabolic Panel; Future  -     TSH+Free T4; Future  -     Hemoglobin A1c; Future  -     MicroAlbumin, Urine, Random - Urine, Clean Catch; Future    9. PAD (peripheral artery disease)  -     CBC & Differential; Future  -     Comprehensive Metabolic Panel; Future  -     TSH+Free T4; Future  -     Hemoglobin A1c; Future  -     MicroAlbumin, Urine, Random - Urine, Clean Catch; Future    Other orders  -     primidone (MYSOLINE) 50 MG tablet; Take 1 tablet by mouth 2 (Two) Times a Day.  Dispense: 60 tablet; Refill: 5        Shaking a lot, she worries about it tremors, as below May 16, 2024    BMI is within normal parameters. No other follow-up for BMI required.     Generalized tremors upper and lower extremities body, not consistent with Parkinson's disease, patient is still ambulatory most likely essential tremors, discussed treatment options side effects of medications November 2023, treatment options discussed as above and below, consideration for propranolol but would be cautious given her underlying COPD, primidone would be the next best option, and a 16 2024,     Claudication lower legs, ----- severe arterial insufficiency left greater than right, has followed up with vascular surgery in the past,, continues rosuvastatin,, Pletal,    Hyperlipidemia continues rosuvastatin 5 mg daily marked improvement May 16, 2024    Lower extremity edema chronic bilateral due to venous insufficiency arterial insufficiency pulmonary issues etc. May 16, 2024     Anxiety depression, continues mirtazapine, 7.5 mg nightly     Hearing deficit, uses hearing aids     Memory deficits, continues over-the-counter Prevagen     Left brain stroke left thalamus left internal capsule May 2014 recommended baby aspirin daily,     Hypertension continues Aldactazide 25/25 mg--- half tablet daily     COPD, discussed quitting tobacco,  again May 2023     Hypothyroid ----previous left thyroidectomy, continues  Synthroid 0.05 mg daily,      Osteopenia remotely diagnosed,     Diabetes type 2 --- 6.3, November 2023--- no medications      Cervical dystonia, pain, continues Zanaflex 2 mg nightly as needed        Follow Up   Return in about 6 months (around 11/16/2024).  Patient was given instructions and counseling regarding her condition or for health maintenance advice. Please see specific information pulled into the AVS if appropriate.

## 2024-05-30 ENCOUNTER — OFFICE VISIT (OUTPATIENT)
Dept: ORTHOPEDIC SURGERY | Facility: CLINIC | Age: 89
End: 2024-05-30
Payer: MEDICARE

## 2024-05-30 VITALS
SYSTOLIC BLOOD PRESSURE: 184 MMHG | OXYGEN SATURATION: 94 % | BODY MASS INDEX: 20.02 KG/M2 | HEIGHT: 63 IN | HEART RATE: 59 BPM | WEIGHT: 113 LBS | DIASTOLIC BLOOD PRESSURE: 80 MMHG

## 2024-05-30 DIAGNOSIS — M67.449 MUCOUS CYST OF FINGER: ICD-10-CM

## 2024-05-30 DIAGNOSIS — M18.11 OSTEOARTHRITIS OF RIGHT THUMB: ICD-10-CM

## 2024-05-30 DIAGNOSIS — M79.641 RIGHT HAND PAIN: Primary | ICD-10-CM

## 2024-05-30 NOTE — PROGRESS NOTES
"Chief Complaint  Initial Evaluation of the Right Thumb     Subjective      Sugar Lee presents to [facility] for an evaluation of the right thumb. She reports she has had a right cyst develop on the thumb region and has increased in size over the last several months. She reports no injury or trauma. He does not reports much pain to the hand. She saw her family doctor where the referral was made for further evaluation.     Allergies   Allergen Reactions    Bee Pollen Unknown - High Severity    Sulfa Antibiotics Other (See Comments)        Social History     Socioeconomic History    Marital status:    Tobacco Use    Smoking status: Every Day     Current packs/day: 0.50     Average packs/day: 0.5 packs/day for 74.4 years (37.2 ttl pk-yrs)     Types: Cigarettes     Start date: 1950    Smokeless tobacco: Never   Vaping Use    Vaping status: Never Used   Substance and Sexual Activity    Alcohol use: Never        I reviewed the patient's chief complaint, history of present illness, review of systems, past medical history, surgical history, family history, social history, medications, and allergy list.     Review of Systems     Constitutional: Denies fevers, chills, weight loss  Cardiovascular: Denies chest pain, shortness of breath  Skin: Denies rashes, acute skin changes  Neurologic: Denies headache, loss of consciousness  MSK: Right thumb pain       Vital Signs:   BP (!) 184/80 Comment: pt aware of BP FU with PCP  Pulse 59   Ht 160 cm (63\")   Wt 51.3 kg (113 lb)   SpO2 94%   BMI 20.02 kg/m²            Ortho Exam    Physical Exam  General:Alert. No acute distress   Right upper extremity: there is a mucus cyst over the dorsal aspect of the ulnar side of the thumb, this is around 1 by 1 cm in size, there are no signs of infections, no redness or drainage, no pain with IP range of motion, arthritic deformities to the thumb IP joint as well as the second and third PIP joints, " neurovascularly intact     Aspirate right thumb  Consent given by: patient  Site marked: site marked  Timeout: Immediately prior to procedure a time out was called to verify the correct patient, procedure, equipment, support staff and site/side marked as required   Procedure Details  Location: thumb - Thumb joint: right thumb.  Preparation: Patient was prepped and draped in the usual sterile fashion  Needle gauge: 18G.  Aspirate: clear (Gel like fluid)  Patient tolerance: patient tolerated the procedure well with no immediate complications      This injection documentation was Scribed for Jorje Boyer MD by Sharon Colvin MA.  05/30/24   16:22 EDT      X-Ray Report:  Right hand  X-Ray  Indication: Evaluation of right hand pain   AP/Lateral view(s)  Findings: advanced degenerative changes to the IP joint, degenerative changes to and deformities to the second and third PIP joints, no acute fracture   Prior studies available for comparison: no       Imaging Results (Most Recent)       Procedure Component Value Units Date/Time    XR Hand 2 View Right [[id number]] Resulted: 05/30/24 1536     Updated: 05/30/24 1540             Result Review :       No results found.           Assessment and Plan     Diagnoses and all orders for this visit:    1. Right hand pain (Primary)  -     XR Hand 2 View Right    2. Mucous cyst of right thumb    3. Osteoarthritis of right thumb    Other orders  -     Aspirate right thumb        Discussed operative treatment options with the patient regarding a right thumb excision of the cyst versus non aspiration due to the patient's age and lack of desire of surgery. She wishes to proceed with conservative treatment.     Discussed the risks and benefits of a right thumb aspiration today in the office. Patient expressed understanding and wishes to proceed. She tolerated this well and without any complications.     Educated on risk of smoking/nicotine. Discussed options for smoking  cessation. and Call or return if worsening symptoms.    Follow Up     6 weeks       Patient was given instructions and counseling regarding her condition or for health maintenance advice. Please see specific information pulled into the AVS if appropriate.     TransScribed for Jorje Boyer MD by Kylee Kimball CMA.  05/30/24   15:39 EDT    I have personally performed the services described in this document as scribed by the above individual and it is both accurate and complete. Jorje Boyer MD 05/31/24

## 2024-06-04 ENCOUNTER — TELEPHONE (OUTPATIENT)
Dept: ORTHOPEDIC SURGERY | Facility: CLINIC | Age: 89
End: 2024-06-04
Payer: MEDICARE

## 2024-06-04 DIAGNOSIS — M67.449 MUCOUS CYST OF FINGER: ICD-10-CM

## 2024-06-04 DIAGNOSIS — M18.11 OSTEOARTHRITIS OF RIGHT THUMB: Primary | ICD-10-CM

## 2024-06-04 RX ORDER — CEPHALEXIN 500 MG/1
CAPSULE ORAL
Qty: 30 CAPSULE | Refills: 0 | Status: SHIPPED | OUTPATIENT
Start: 2024-06-04

## 2024-06-04 NOTE — TELEPHONE ENCOUNTER
Caller: EDWIN  Relationship to Patient: DAUGHTER   Phone Number: [phone] (home)   Reason for Call: PATIENTS DAUGHTER CALLING STATING THAT HER BROTHER NOTICED THAT THEIR IS SOME SWELLING IN THE THUMB WHERE THE INJECTION WAS PUT IN. SHE STATES THAT IT IS STARTING TO HURT AS WELL

## 2024-06-04 NOTE — TELEPHONE ENCOUNTER
"PATIENT'S DAUGHTER CALLED WITH CONCERN OVER THE AREA THAT WAS RED AND A \"STREAK \" GOING UP THE ARM\".  PER DR CASTELLANOS PATIENT TO START ANTIBIOTICS AND TO SEE HIM ON THURSDAY.  KEFLEX 500MG TID WAS CALLED INTO PHARMACY.  DAUGHTER VOICED  UNDERSTANDING.  PATIENT TO GO TO ER WITH INCREASE IN SYMPTOMS.    "

## 2024-06-06 ENCOUNTER — OFFICE VISIT (OUTPATIENT)
Dept: ORTHOPEDIC SURGERY | Facility: CLINIC | Age: 89
End: 2024-06-06
Payer: MEDICARE

## 2024-06-06 VITALS
WEIGHT: 113 LBS | DIASTOLIC BLOOD PRESSURE: 87 MMHG | HEIGHT: 63 IN | BODY MASS INDEX: 20.02 KG/M2 | HEART RATE: 72 BPM | OXYGEN SATURATION: 94 % | SYSTOLIC BLOOD PRESSURE: 174 MMHG

## 2024-06-06 DIAGNOSIS — M67.449 MUCOUS CYST OF FINGER: Primary | ICD-10-CM

## 2024-06-06 NOTE — PROGRESS NOTES
"Chief Complaint  Pain and Follow-up of the Right Hand     Subjective      Sugar Lee presents to [facility] for a follow up for her right hand. She was last seen in the office on 05/30/24 where she had her cyst aspirated. Since her aspiration she reports an increase in pain and a red streak up her arm. She has started anti biotics for this. Her daughter presents to the office with her. She reports some tenderness to her thumb.     Allergies   Allergen Reactions    Bee Pollen Unknown - High Severity    Sulfa Antibiotics Other (See Comments)        Social History     Socioeconomic History    Marital status:    Tobacco Use    Smoking status: Every Day     Current packs/day: 0.50     Average packs/day: 0.5 packs/day for 74.4 years (37.2 ttl pk-yrs)     Types: Cigarettes     Start date: 1950    Smokeless tobacco: Never   Vaping Use    Vaping status: Never Used   Substance and Sexual Activity    Alcohol use: Never        I reviewed the patient's chief complaint, history of present illness, review of systems, past medical history, surgical history, family history, social history, medications, and allergy list.     Review of Systems     Constitutional: Denies fevers, chills, weight loss  Cardiovascular: Denies chest pain, shortness of breath  Skin: Denies rashes, acute skin changes  Neurologic: Denies headache, loss of consciousness  MSK: Right hand pain       Vital Signs:   /87   Pulse 72   Ht 160 cm (63\")   Wt 51.3 kg (113 lb)   SpO2 94%   BMI 20.02 kg/m²            Ortho Exam    Physical Exam  General:Alert. No acute distress   Right upper extremity: recurrence of a mucus cyst, neurovascularly intact, no current signs of infections, no drainage, mild redness to the thumb, no warmth, mild tenderness, range of motion grossly intact     Procedures    Imaging Results (Most Recent)       None             Result Review :       XR Hand 2 View Right    Result Date: " 6/4/2024  Narrative: X-Ray Report: Right hand  X-Ray Indication: Evaluation of right hand pain AP/Lateral view(s) Findings: advanced degenerative changes to the IP joint, degenerative changes to and deformities to the second and third PIP joints, no acute fracture Prior studies available for comparison: no             Assessment and Plan     Diagnoses and all orders for this visit:    1. Mucous cyst of finger (Primary)        The patient presents here today for a follow up for her right thumb cyst.     Advised patient to avoid any creams or lotions over the cyst.     She will continue the anti biotics for now and monitor her finger.        Educated on risk of smoking/nicotine. Discussed options for smoking cessation. and Call or return if worsening symptoms.    Follow Up     4 weeks        Patient was given instructions and counseling regarding her condition or for health maintenance advice. Please see specific information pulled into the AVS if appropriate.     Scribed for Jorje Boyer MD by Kylee Kimball.  06/06/24   14:12 EDT    I have personally performed the services described in this document as scribed by the above individual and it is both accurate and complete. Jorje Boyer MD 06/08/24

## 2024-06-20 RX ORDER — ROSUVASTATIN CALCIUM 5 MG/1
5 TABLET, COATED ORAL
Qty: 90 TABLET | Refills: 1 | Status: SHIPPED | OUTPATIENT
Start: 2024-06-20

## 2024-07-11 ENCOUNTER — OFFICE VISIT (OUTPATIENT)
Dept: ORTHOPEDIC SURGERY | Facility: CLINIC | Age: 89
End: 2024-07-11
Payer: MEDICARE

## 2024-07-11 VITALS
SYSTOLIC BLOOD PRESSURE: 189 MMHG | HEART RATE: 67 BPM | BODY MASS INDEX: 20.02 KG/M2 | OXYGEN SATURATION: 97 % | HEIGHT: 63 IN | WEIGHT: 113 LBS | DIASTOLIC BLOOD PRESSURE: 68 MMHG

## 2024-07-11 DIAGNOSIS — M67.449 MUCOUS CYST OF FINGER: Primary | ICD-10-CM

## 2024-07-11 NOTE — PROGRESS NOTES
"Chief Complaint  Follow-up of the Right Hand     Subjective      Sugar Lee presents to [facility] for a follow up for her right hand. She had her right cyst aspirated in the office on 05/30/24 but states her cyst is bigger. She reports no new injury, trauma or fall since her last visit. After her last aspiration she had an increase in pain and redness to her finger. She reports minimal pain today and has been wearing a band aid over the cyst.     Allergies   Allergen Reactions    Bee Pollen Unknown - High Severity    Sulfa Antibiotics Other (See Comments)        Social History     Socioeconomic History    Marital status:    Tobacco Use    Smoking status: Every Day     Current packs/day: 0.50     Average packs/day: 0.5 packs/day for 74.5 years (37.3 ttl pk-yrs)     Types: Cigarettes     Start date: 1950    Smokeless tobacco: Never   Vaping Use    Vaping status: Never Used   Substance and Sexual Activity    Alcohol use: Never        I reviewed the patient's chief complaint, history of present illness, review of systems, past medical history, surgical history, family history, social history, medications, and allergy list.     Review of Systems     Constitutional: Denies fevers, chills, weight loss  Cardiovascular: Denies chest pain, shortness of breath  Skin: Denies rashes, acute skin changes  Neurologic: Denies headache, loss of consciousness  MSK: Right hand pain       Vital Signs:   BP (!) 189/68   Pulse 67   Ht 160 cm (63\")   Wt 51.3 kg (113 lb)   SpO2 97%   BMI 20.02 kg/m²            Ortho Exam    Physical Exam  General:Alert. No acute distress   Right upper extremity: 1 by 0.5 centimeter, she can flex and extend the IP joint, no current signs of infections, no drainage, no warmth, mild tenderness, range of motion grossly intact     Procedures    Imaging Results (Most Recent)       None             Result Review :       No results found.           Assessment and Plan "     Diagnoses and all orders for this visit:    1. Mucous cyst of finger (Primary)        The patient presents here today for a follow up for her right hand.     Discussed operative treatment options regarding a cyst removal versus non operative treatment options as far as to continue to monitor her symptoms.     She will continue conservative treatment options at this time due to her age.     She will continue to cover the cyst with a band aid.     Educated on risk of smoking/nicotine. Discussed options for smoking cessation. and Call or return if worsening symptoms.    Follow Up     PRN    Patient was given instructions and counseling regarding her condition or for health maintenance advice. Please see specific information pulled into the AVS if appropriate.     Scribed for Jorje Boyer MD by Kylee Kimball.  07/11/24   13:38 EDT    I have personally performed the services described in this document as scribed by the above individual and it is both accurate and complete. Jorje Boyer MD 07/14/24

## 2024-10-28 DIAGNOSIS — I73.9 PAD (PERIPHERAL ARTERY DISEASE): Primary | ICD-10-CM

## 2024-10-28 DIAGNOSIS — I63.312 CEREBROVASCULAR ACCIDENT (CVA) DUE TO THROMBOSIS OF LEFT MIDDLE CEREBRAL ARTERY: ICD-10-CM

## 2024-10-28 RX ORDER — CILOSTAZOL 50 MG/1
TABLET ORAL
Qty: 120 TABLET | Refills: 5 | Status: SHIPPED | OUTPATIENT
Start: 2024-10-28

## 2024-10-28 NOTE — TELEPHONE ENCOUNTER
Caller: DENISESIERRA    Relationship: DAUGHTER    Best call back number: [phone]    Requested Prescriptions:   Requested Prescriptions     Pending Prescriptions Disp Refills    cilostazol (PLETAL) 50 MG tablet [Pharmacy Med Name: cilostazol 50 mg tablet] 120 tablet 5     Sig: TAKE 2 TABLETS BY MOUTH TWICE DAILY        Pharmacy where request should be sent: [address] [phone] [facility] [phone]      Last office visit with prescribing clinician: 5/16/2024   Last telemedicine visit with prescribing clinician: Visit date not found   Next office visit with prescribing clinician: 11/14/2024         Does the patient have less than a 3 day supply:  [] Yes  [x] No        Betty Newman Rep   10/28/24 09:29 EDT

## 2024-11-07 ENCOUNTER — LAB (OUTPATIENT)
Dept: LAB | Facility: HOSPITAL | Age: 89
End: 2024-11-07
Payer: MEDICARE

## 2024-11-07 DIAGNOSIS — J43.2 CENTRILOBULAR EMPHYSEMA: ICD-10-CM

## 2024-11-07 DIAGNOSIS — G25.0 ESSENTIAL TREMOR: ICD-10-CM

## 2024-11-07 DIAGNOSIS — I73.9 PAD (PERIPHERAL ARTERY DISEASE): ICD-10-CM

## 2024-11-07 DIAGNOSIS — F33.1 MAJOR DEPRESSIVE DISORDER, RECURRENT, MODERATE: ICD-10-CM

## 2024-11-07 DIAGNOSIS — E06.3 HYPOTHYROIDISM DUE TO HASHIMOTO'S THYROIDITIS: ICD-10-CM

## 2024-11-07 DIAGNOSIS — I10 HYPERTENSION, ESSENTIAL: ICD-10-CM

## 2024-11-07 DIAGNOSIS — E11.9 TYPE 2 DIABETES MELLITUS WITHOUT COMPLICATION, WITHOUT LONG-TERM CURRENT USE OF INSULIN: ICD-10-CM

## 2024-11-07 DIAGNOSIS — I63.312 CEREBROVASCULAR ACCIDENT (CVA) DUE TO THROMBOSIS OF LEFT MIDDLE CEREBRAL ARTERY: ICD-10-CM

## 2024-11-07 DIAGNOSIS — F41.1 ANXIETY, GENERALIZED: ICD-10-CM

## 2024-11-07 LAB
ALBUMIN SERPL-MCNC: 3.6 G/DL (ref 3.5–5.2)
ALBUMIN UR-MCNC: 1.9 MG/DL
ALBUMIN/GLOB SERPL: 1.1 G/DL
ALP SERPL-CCNC: 82 U/L (ref 39–117)
ALT SERPL W P-5'-P-CCNC: 11 U/L (ref 1–33)
ANION GAP SERPL CALCULATED.3IONS-SCNC: 7.8 MMOL/L (ref 5–15)
AST SERPL-CCNC: 22 U/L (ref 1–32)
BASOPHILS # BLD AUTO: 0.09 10*3/MM3 (ref 0–0.2)
BASOPHILS NFR BLD AUTO: 0.9 % (ref 0–1.5)
BILIRUB SERPL-MCNC: 0.5 MG/DL (ref 0–1.2)
BUN SERPL-MCNC: 25 MG/DL (ref 8–23)
BUN/CREAT SERPL: 23.8 (ref 7–25)
CALCIUM SPEC-SCNC: 10 MG/DL (ref 8.2–9.6)
CHLORIDE SERPL-SCNC: 101 MMOL/L (ref 98–107)
CO2 SERPL-SCNC: 29.2 MMOL/L (ref 22–29)
CREAT SERPL-MCNC: 1.05 MG/DL (ref 0.57–1)
DEPRECATED RDW RBC AUTO: 39.3 FL (ref 37–54)
EGFRCR SERPLBLD CKD-EPI 2021: 50 ML/MIN/1.73
EOSINOPHIL # BLD AUTO: 0.16 10*3/MM3 (ref 0–0.4)
EOSINOPHIL NFR BLD AUTO: 1.7 % (ref 0.3–6.2)
ERYTHROCYTE [DISTWIDTH] IN BLOOD BY AUTOMATED COUNT: 11.9 % (ref 12.3–15.4)
GLOBULIN UR ELPH-MCNC: 3.4 GM/DL
GLUCOSE SERPL-MCNC: 140 MG/DL (ref 65–99)
HBA1C MFR BLD: 6.3 % (ref 4.8–5.6)
HCT VFR BLD AUTO: 43.3 % (ref 34–46.6)
HGB BLD-MCNC: 14.6 G/DL (ref 12–15.9)
IMM GRANULOCYTES # BLD AUTO: 0.09 10*3/MM3 (ref 0–0.05)
IMM GRANULOCYTES NFR BLD AUTO: 0.9 % (ref 0–0.5)
LYMPHOCYTES # BLD AUTO: 2.18 10*3/MM3 (ref 0.7–3.1)
LYMPHOCYTES NFR BLD AUTO: 22.7 % (ref 19.6–45.3)
MCH RBC QN AUTO: 30.6 PG (ref 26.6–33)
MCHC RBC AUTO-ENTMCNC: 33.7 G/DL (ref 31.5–35.7)
MCV RBC AUTO: 90.8 FL (ref 79–97)
MONOCYTES # BLD AUTO: 0.75 10*3/MM3 (ref 0.1–0.9)
MONOCYTES NFR BLD AUTO: 7.8 % (ref 5–12)
NEUTROPHILS NFR BLD AUTO: 6.33 10*3/MM3 (ref 1.7–7)
NEUTROPHILS NFR BLD AUTO: 66 % (ref 42.7–76)
NRBC BLD AUTO-RTO: 0 /100 WBC (ref 0–0.2)
PLATELET # BLD AUTO: 260 10*3/MM3 (ref 140–450)
PMV BLD AUTO: 10.7 FL (ref 6–12)
POTASSIUM SERPL-SCNC: 4.2 MMOL/L (ref 3.5–5.2)
PROT SERPL-MCNC: 7 G/DL (ref 6–8.5)
RBC # BLD AUTO: 4.77 10*6/MM3 (ref 3.77–5.28)
SODIUM SERPL-SCNC: 138 MMOL/L (ref 136–145)
T4 FREE SERPL-MCNC: 1.51 NG/DL (ref 0.92–1.68)
TSH SERPL DL<=0.05 MIU/L-ACNC: 4.14 UIU/ML (ref 0.27–4.2)
WBC NRBC COR # BLD AUTO: 9.6 10*3/MM3 (ref 3.4–10.8)

## 2024-11-07 PROCEDURE — 84439 ASSAY OF FREE THYROXINE: CPT

## 2024-11-07 PROCEDURE — 80053 COMPREHEN METABOLIC PANEL: CPT

## 2024-11-07 PROCEDURE — 83036 HEMOGLOBIN GLYCOSYLATED A1C: CPT

## 2024-11-07 PROCEDURE — 36415 COLL VENOUS BLD VENIPUNCTURE: CPT

## 2024-11-07 PROCEDURE — 82043 UR ALBUMIN QUANTITATIVE: CPT

## 2024-11-07 PROCEDURE — 84443 ASSAY THYROID STIM HORMONE: CPT

## 2024-11-07 PROCEDURE — 85025 COMPLETE CBC W/AUTO DIFF WBC: CPT

## 2024-11-14 ENCOUNTER — OFFICE VISIT (OUTPATIENT)
Dept: INTERNAL MEDICINE | Age: 89
End: 2024-11-14
Payer: MEDICARE

## 2024-11-14 VITALS
HEART RATE: 85 BPM | BODY MASS INDEX: 18.61 KG/M2 | SYSTOLIC BLOOD PRESSURE: 106 MMHG | DIASTOLIC BLOOD PRESSURE: 70 MMHG | OXYGEN SATURATION: 96 % | WEIGHT: 105 LBS | TEMPERATURE: 97.4 F | HEIGHT: 63 IN

## 2024-11-14 DIAGNOSIS — F41.1 ANXIETY, GENERALIZED: ICD-10-CM

## 2024-11-14 DIAGNOSIS — I73.9 PAD (PERIPHERAL ARTERY DISEASE): ICD-10-CM

## 2024-11-14 DIAGNOSIS — H90.6 MIXED CONDUCTIVE AND SENSORINEURAL HEARING LOSS OF BOTH EARS: ICD-10-CM

## 2024-11-14 DIAGNOSIS — Z23 NEED FOR IMMUNIZATION AGAINST INFLUENZA: ICD-10-CM

## 2024-11-14 DIAGNOSIS — G25.0 ESSENTIAL TREMOR: ICD-10-CM

## 2024-11-14 DIAGNOSIS — I63.312 CEREBROVASCULAR ACCIDENT (CVA) DUE TO THROMBOSIS OF LEFT MIDDLE CEREBRAL ARTERY: ICD-10-CM

## 2024-11-14 DIAGNOSIS — F33.1 MAJOR DEPRESSIVE DISORDER, RECURRENT, MODERATE: ICD-10-CM

## 2024-11-14 DIAGNOSIS — E11.9 TYPE 2 DIABETES MELLITUS WITHOUT COMPLICATION, WITHOUT LONG-TERM CURRENT USE OF INSULIN: ICD-10-CM

## 2024-11-14 DIAGNOSIS — I10 HYPERTENSION, ESSENTIAL: ICD-10-CM

## 2024-11-14 DIAGNOSIS — E06.3 HYPOTHYROIDISM DUE TO HASHIMOTO'S THYROIDITIS: ICD-10-CM

## 2024-11-14 DIAGNOSIS — J43.2 CENTRILOBULAR EMPHYSEMA: Primary | ICD-10-CM

## 2024-11-14 NOTE — PROGRESS NOTES
"CHIEF COMPLAINT/ HPI:  Follow-up (89 year old female here for 6 month follow up./Pt has had labs. Pt's daughter wishes to review labs. /Pt complains of shaking, states the primidone wasn't helping./Pt complains of issues doing day to day tasks.) and Emphysema              Objective   Vital Signs  Vitals:    11/14/24 1215   BP: 106/70   BP Location: Left arm   Patient Position: Sitting   Cuff Size: Adult   Pulse: 85   Temp: 97.4 °F (36.3 °C)   TempSrc: Skin   SpO2: 96%   Weight: 47.6 kg (105 lb)   Height: 160 cm (63\")      Body mass index is 18.6 kg/m².  Review of Systems   Constitutional: Negative.    HENT:  Positive for hearing loss.    Eyes: Negative.    Respiratory: Negative.     Cardiovascular: Negative.    Gastrointestinal: Negative.    Endocrine: Negative.    Genitourinary: Negative.    Musculoskeletal: Negative.    Allergic/Immunologic: Negative.    Neurological: Negative.    Hematological: Negative.    Psychiatric/Behavioral: Negative.        Physical Exam  Constitutional:       General: She is not in acute distress.     Appearance: Normal appearance.   HENT:      Head: Normocephalic.      Mouth/Throat:      Mouth: Mucous membranes are moist.   Eyes:      Conjunctiva/sclera: Conjunctivae normal.      Pupils: Pupils are equal, round, and reactive to light.   Cardiovascular:      Rate and Rhythm: Normal rate and regular rhythm.      Pulses: Normal pulses.      Heart sounds: Normal heart sounds.   Pulmonary:      Effort: Pulmonary effort is normal.   Abdominal:      General: Abdomen is flat. Bowel sounds are normal.      Palpations: Abdomen is soft.   Musculoskeletal:         General: No swelling. Normal range of motion.      Cervical back: Neck supple.   Skin:     General: Skin is warm and dry.      Coloration: Skin is not jaundiced.   Neurological:      General: No focal deficit present.      Mental Status: She is alert and oriented to person, place, and time. Mental status is at baseline.   Psychiatric:    " "     Mood and Affect: Mood normal.         Behavior: Behavior normal.         Thought Content: Thought content normal.         Judgment: Judgment normal.     Decreased breath sounds bilateral, hard of hearing,  Result Review :   No results found for: \"PROBNP\", \"BNP\"  CMP          5/9/2024    09:37 11/7/2024    10:09   CMP   Glucose 107  140    BUN 16  25    Creatinine 0.95  1.05    EGFR 56.7  50.0    Sodium 142  138    Potassium 3.6  4.2    Chloride 102  101    Calcium 9.5  10.0    Total Protein 6.5  7.0    Albumin 3.7  3.6    Globulin 2.8  3.4    Total Bilirubin 0.7  0.5    Alkaline Phosphatase 108  82    AST (SGOT) 26  22    ALT (SGPT) 32  11    Albumin/Globulin Ratio 1.3  1.1    BUN/Creatinine Ratio 16.8  23.8    Anion Gap 10.8  7.8      CBC w/diff          5/9/2024    09:37 11/7/2024    10:09   CBC w/Diff   WBC 8.69  9.60    RBC 4.39  4.77    Hemoglobin 12.8  14.6    Hematocrit 40.3  43.3    MCV 91.8  90.8    MCH 29.2  30.6    MCHC 31.8  33.7    RDW 12.8  11.9    Platelets 242  260    Neutrophil Rel % 53.4  66.0    Immature Granulocyte Rel % 0.3  0.9    Lymphocyte Rel % 35.6  22.7    Monocyte Rel % 8.1  7.8    Eosinophil Rel % 1.8  1.7    Basophil Rel % 0.8  0.9       Lipid Panel          5/9/2024    09:37   Lipid Panel   Total Cholesterol 162    Triglycerides 70    HDL Cholesterol 51    VLDL Cholesterol 14    LDL Cholesterol  97    LDL/HDL Ratio 1.90       Lab Results   Component Value Date    TSH 4.140 11/07/2024    TSH 4.170 05/09/2024    TSH 3.510 11/09/2023      Lab Results   Component Value Date    FREET4 1.51 11/07/2024    FREET4 1.23 05/09/2024    FREET4 1.40 11/09/2023      A1C Last 3 Results          5/9/2024    09:37 11/7/2024    10:09   HGBA1C Last 3 Results   Hemoglobin A1C 5.90  6.30                        Visit Diagnoses:    ICD-10-CM ICD-9-CM   1. Centrilobular emphysema  J43.2 492.8   2. Need for immunization against influenza  Z23 V04.81   3. Anxiety, generalized  F41.1 300.02   4. Type 2 " diabetes mellitus without complication, without long-term current use of insulin  E11.9 250.00   5. Hypertension, essential  I10 401.9   6. PAD (peripheral artery disease)  I73.9 443.9   7. Hypothyroidism due to Hashimoto's thyroiditis  E06.3 245.2   8. Major depressive disorder, recurrent, moderate  F33.1 296.32   9. Essential tremor  G25.0 333.1   10. Cerebrovascular accident (CVA) due to thrombosis of left middle cerebral artery  I63.312 434.01   11. Mixed conductive and sensorineural hearing loss of both ears  H90.6 389.22       Assessment and Plan   Diagnoses and all orders for this visit:    1. Centrilobular emphysema (Primary)  -     CBC & Differential; Future  -     Comprehensive Metabolic Panel; Future  -     Hemoglobin A1c; Future  -     Lipid Panel; Future    2. Need for immunization against influenza  -     Fluzone High-Dose 65+yrs (1819-5049)  -     CBC & Differential; Future  -     Comprehensive Metabolic Panel; Future  -     Hemoglobin A1c; Future  -     Lipid Panel; Future    3. Anxiety, generalized  -     CBC & Differential; Future  -     Comprehensive Metabolic Panel; Future  -     Hemoglobin A1c; Future  -     Lipid Panel; Future    4. Type 2 diabetes mellitus without complication, without long-term current use of insulin  -     CBC & Differential; Future  -     Comprehensive Metabolic Panel; Future  -     Hemoglobin A1c; Future  -     Lipid Panel; Future    5. Hypertension, essential  -     CBC & Differential; Future  -     Comprehensive Metabolic Panel; Future  -     Hemoglobin A1c; Future  -     Lipid Panel; Future    6. PAD (peripheral artery disease)  -     CBC & Differential; Future  -     Comprehensive Metabolic Panel; Future  -     Hemoglobin A1c; Future  -     Lipid Panel; Future    7. Hypothyroidism due to Hashimoto's thyroiditis  -     CBC & Differential; Future  -     Comprehensive Metabolic Panel; Future  -     Hemoglobin A1c; Future  -     Lipid Panel; Future    8. Major depressive  disorder, recurrent, moderate  -     CBC & Differential; Future  -     Comprehensive Metabolic Panel; Future  -     Hemoglobin A1c; Future  -     Lipid Panel; Future    9. Essential tremor  -     CBC & Differential; Future  -     Comprehensive Metabolic Panel; Future  -     Hemoglobin A1c; Future  -     Lipid Panel; Future    10. Cerebrovascular accident (CVA) due to thrombosis of left middle cerebral artery  -     CBC & Differential; Future  -     Comprehensive Metabolic Panel; Future  -     Hemoglobin A1c; Future  -     Lipid Panel; Future    11. Mixed conductive and sensorineural hearing loss of both ears  -     CBC & Differential; Future  -     Comprehensive Metabolic Panel; Future  -     Hemoglobin A1c; Future  -     Lipid Panel; Future        BMI is within normal parameters. No other follow-up for BMI required.     Shaking a lot, she worries about it tremors, as below May 16, 2024 tried primidone but made her very sleepy and groggy, stopped,    Generalized tremors upper and lower extremities body, not consistent with Parkinson's disease, patient is still ambulatory most likely essential tremors, discussed treatment options side effects of medications November 2023, treatment options discussed as above and below, consideration for propranolol but would be cautious given her underlying COPD, primidone would be the next best option, and a 16 2024,     Claudication lower legs, ----- severe arterial insufficiency left greater than right, has followed up with vascular surgery in the past,, continues rosuvastatin,, Pletal,    Hyperlipidemia continues rosuvastatin 5 mg daily     Lower extremity edema chronic bilateral due to venous insufficiency arterial insufficiency pulmonary issues etc. May 16, 2024     Anxiety depression, continues mirtazapine, 7.5 mg nightly     Hearing deficit, uses hearing aids     Memory deficits, continues over-the-counter Prevagen, getting worse per daughter, November 2024 not     Left brain  stroke left thalamus left internal capsule May 2014 recommended baby aspirin daily,     Hypertension continues Aldactazide 25/25 mg--- half tablet daily     COPD, discussed quitting tobacco, again May 2023     Hypothyroid --- left thyroidectomy, continues  Synthroid 0.05 mg daily,      Osteopenia remotely diagnosed,     Diabetes type 2 --- 6.3, stable November 7, 2024,- no medications urine microalbumin normal 1.9 November 7, 2024--- has Amaryl ordered as needed     Cervical dystonia, pain, continues Zanaflex 2 mg nightly as needed        Follow Up   Return in about 6 months (around 5/14/2025).  Patient was given instructions and counseling regarding her condition or for health maintenance advice. Please see specific information pulled into the AVS if appropriate.

## 2024-12-16 RX ORDER — SPIRONOLACTONE AND HYDROCHLOROTHIAZIDE 25; 25 MG/1; MG/1
0.5 TABLET ORAL DAILY
Qty: 45 TABLET | Refills: 4 | Status: SHIPPED | OUTPATIENT
Start: 2024-12-16

## 2024-12-16 RX ORDER — LEVOTHYROXINE SODIUM 50 UG/1
50 TABLET ORAL DAILY
Qty: 90 TABLET | Refills: 3 | Status: SHIPPED | OUTPATIENT
Start: 2024-12-16

## 2024-12-16 NOTE — TELEPHONE ENCOUNTER
Caller: LeeSugar    Relationship: Self    Best call back number: [phone]    Requested Prescriptions:   Requested Prescriptions     Pending Prescriptions Disp Refills    levothyroxine (SYNTHROID, LEVOTHROID) 50 MCG tablet 90 tablet 3     Sig: Take 1 tablet by mouth Daily.    spironolactone-hydrochlorothiazide (ALDACTAZIDE) 25-25 MG tablet 45 tablet 4     Sig: Take 0.5 tablets by mouth Daily.        Pharmacy where request should be sent: [address] [phone] [facility] [phone]      Last office visit with prescribing clinician: 11/14/2024   Last telemedicine visit with prescribing clinician: Visit date not found   Next office visit with prescribing clinician: 5/14/2025     Does the patient have less than a 3 day supply:  [x] Yes  [] No    Would you like a call back once the refill request has been completed: [] Yes [x] No    If the office needs to give you a call back, can they leave a voicemail: [] Yes [x] No    Betty Bose Rep   12/16/24 09:28 EST

## 2025-03-17 ENCOUNTER — PATIENT MESSAGE (OUTPATIENT)
Dept: INTERNAL MEDICINE | Age: OVER 89
End: 2025-03-17
Payer: MEDICARE

## 2025-03-18 RX ORDER — BLOOD SUGAR DIAGNOSTIC
1 STRIP MISCELLANEOUS DAILY
Qty: 30 EACH | Refills: 5 | Status: SHIPPED | OUTPATIENT
Start: 2025-03-18

## 2025-05-06 ENCOUNTER — TELEPHONE (OUTPATIENT)
Dept: INTERNAL MEDICINE | Age: OVER 89
End: 2025-05-06

## 2025-05-06 NOTE — TELEPHONE ENCOUNTER
Caller: DENISE (POA),EDWIN    Relationship: Emergency Contact    Best call back number:     [phone]       What was the call regarding: PATIENT'S DAUGHTER STATES THAT THEY NEED TO KNOW IF THE PATIENT NEEDS TO FAST FOR HER LABS.     SHE WOULD LIKE A CALLBACK TODAY SO SHE CAN TAKE HER TOMORROW.

## 2025-05-07 ENCOUNTER — LAB (OUTPATIENT)
Dept: LAB | Facility: HOSPITAL | Age: OVER 89
End: 2025-05-07
Payer: MEDICARE

## 2025-05-07 DIAGNOSIS — H90.6 MIXED CONDUCTIVE AND SENSORINEURAL HEARING LOSS OF BOTH EARS: ICD-10-CM

## 2025-05-07 DIAGNOSIS — E11.9 TYPE 2 DIABETES MELLITUS WITHOUT COMPLICATION, WITHOUT LONG-TERM CURRENT USE OF INSULIN: ICD-10-CM

## 2025-05-07 DIAGNOSIS — G25.0 ESSENTIAL TREMOR: ICD-10-CM

## 2025-05-07 DIAGNOSIS — I73.9 PAD (PERIPHERAL ARTERY DISEASE): ICD-10-CM

## 2025-05-07 DIAGNOSIS — Z23 NEED FOR IMMUNIZATION AGAINST INFLUENZA: ICD-10-CM

## 2025-05-07 DIAGNOSIS — J43.2 CENTRILOBULAR EMPHYSEMA: ICD-10-CM

## 2025-05-07 DIAGNOSIS — E06.3 HYPOTHYROIDISM DUE TO HASHIMOTO'S THYROIDITIS: ICD-10-CM

## 2025-05-07 DIAGNOSIS — I10 HYPERTENSION, ESSENTIAL: ICD-10-CM

## 2025-05-07 DIAGNOSIS — I63.312 CEREBROVASCULAR ACCIDENT (CVA) DUE TO THROMBOSIS OF LEFT MIDDLE CEREBRAL ARTERY: ICD-10-CM

## 2025-05-07 DIAGNOSIS — F41.1 ANXIETY, GENERALIZED: ICD-10-CM

## 2025-05-07 DIAGNOSIS — F33.1 MAJOR DEPRESSIVE DISORDER, RECURRENT, MODERATE: ICD-10-CM

## 2025-05-07 LAB
ALBUMIN SERPL-MCNC: 3.7 G/DL (ref 3.5–5.2)
ALBUMIN/GLOB SERPL: 1.2 G/DL
ALP SERPL-CCNC: 111 U/L (ref 39–117)
ALT SERPL W P-5'-P-CCNC: 14 U/L (ref 1–33)
ANION GAP SERPL CALCULATED.3IONS-SCNC: 10.8 MMOL/L (ref 5–15)
AST SERPL-CCNC: 27 U/L (ref 1–32)
BASOPHILS # BLD AUTO: 0.08 10*3/MM3 (ref 0–0.2)
BASOPHILS NFR BLD AUTO: 0.9 % (ref 0–1.5)
BILIRUB SERPL-MCNC: 0.5 MG/DL (ref 0–1.2)
BUN SERPL-MCNC: 21 MG/DL (ref 8–23)
BUN/CREAT SERPL: 20.6 (ref 7–25)
CALCIUM SPEC-SCNC: 10 MG/DL (ref 8.2–9.6)
CHLORIDE SERPL-SCNC: 99 MMOL/L (ref 98–107)
CHOLEST SERPL-MCNC: 195 MG/DL (ref 0–200)
CO2 SERPL-SCNC: 30.2 MMOL/L (ref 22–29)
CREAT SERPL-MCNC: 1.02 MG/DL (ref 0.57–1)
DEPRECATED RDW RBC AUTO: 39.2 FL (ref 37–54)
EGFRCR SERPLBLD CKD-EPI 2021: 51.7 ML/MIN/1.73
EOSINOPHIL # BLD AUTO: 0.27 10*3/MM3 (ref 0–0.4)
EOSINOPHIL NFR BLD AUTO: 3 % (ref 0.3–6.2)
ERYTHROCYTE [DISTWIDTH] IN BLOOD BY AUTOMATED COUNT: 11.7 % (ref 12.3–15.4)
GLOBULIN UR ELPH-MCNC: 3.1 GM/DL
GLUCOSE SERPL-MCNC: 119 MG/DL (ref 65–99)
HBA1C MFR BLD: 6.6 % (ref 4.8–5.6)
HCT VFR BLD AUTO: 41.1 % (ref 34–46.6)
HDLC SERPL-MCNC: 52 MG/DL (ref 40–60)
HGB BLD-MCNC: 13.5 G/DL (ref 12–15.9)
IMM GRANULOCYTES # BLD AUTO: 0.07 10*3/MM3 (ref 0–0.05)
IMM GRANULOCYTES NFR BLD AUTO: 0.8 % (ref 0–0.5)
LDLC SERPL CALC-MCNC: 125 MG/DL (ref 0–100)
LDLC/HDLC SERPL: 2.37 {RATIO}
LYMPHOCYTES # BLD AUTO: 3.5 10*3/MM3 (ref 0.7–3.1)
LYMPHOCYTES NFR BLD AUTO: 38.5 % (ref 19.6–45.3)
MCH RBC QN AUTO: 30 PG (ref 26.6–33)
MCHC RBC AUTO-ENTMCNC: 32.8 G/DL (ref 31.5–35.7)
MCV RBC AUTO: 91.3 FL (ref 79–97)
MONOCYTES # BLD AUTO: 0.63 10*3/MM3 (ref 0.1–0.9)
MONOCYTES NFR BLD AUTO: 6.9 % (ref 5–12)
NEUTROPHILS NFR BLD AUTO: 4.53 10*3/MM3 (ref 1.7–7)
NEUTROPHILS NFR BLD AUTO: 49.9 % (ref 42.7–76)
NRBC BLD AUTO-RTO: 0 /100 WBC (ref 0–0.2)
PLATELET # BLD AUTO: 266 10*3/MM3 (ref 140–450)
PMV BLD AUTO: 10.5 FL (ref 6–12)
POTASSIUM SERPL-SCNC: 4 MMOL/L (ref 3.5–5.2)
PROT SERPL-MCNC: 6.8 G/DL (ref 6–8.5)
RBC # BLD AUTO: 4.5 10*6/MM3 (ref 3.77–5.28)
SODIUM SERPL-SCNC: 140 MMOL/L (ref 136–145)
TRIGL SERPL-MCNC: 100 MG/DL (ref 0–150)
VLDLC SERPL-MCNC: 18 MG/DL (ref 5–40)
WBC NRBC COR # BLD AUTO: 9.08 10*3/MM3 (ref 3.4–10.8)

## 2025-05-07 PROCEDURE — 36415 COLL VENOUS BLD VENIPUNCTURE: CPT

## 2025-05-07 PROCEDURE — 80053 COMPREHEN METABOLIC PANEL: CPT

## 2025-05-07 PROCEDURE — 85025 COMPLETE CBC W/AUTO DIFF WBC: CPT

## 2025-05-07 PROCEDURE — 80061 LIPID PANEL: CPT

## 2025-05-07 PROCEDURE — 83036 HEMOGLOBIN GLYCOSYLATED A1C: CPT

## 2025-05-14 ENCOUNTER — OFFICE VISIT (OUTPATIENT)
Dept: INTERNAL MEDICINE | Age: OVER 89
End: 2025-05-14
Payer: MEDICARE

## 2025-05-14 VITALS
HEART RATE: 71 BPM | TEMPERATURE: 98 F | WEIGHT: 98.8 LBS | SYSTOLIC BLOOD PRESSURE: 135 MMHG | OXYGEN SATURATION: 97 % | HEIGHT: 63 IN | DIASTOLIC BLOOD PRESSURE: 76 MMHG | BODY MASS INDEX: 17.5 KG/M2

## 2025-05-14 DIAGNOSIS — I73.9 PAD (PERIPHERAL ARTERY DISEASE): ICD-10-CM

## 2025-05-14 DIAGNOSIS — I10 HYPERTENSION, ESSENTIAL: ICD-10-CM

## 2025-05-14 DIAGNOSIS — I63.312 CEREBROVASCULAR ACCIDENT (CVA) DUE TO THROMBOSIS OF LEFT MIDDLE CEREBRAL ARTERY: ICD-10-CM

## 2025-05-14 DIAGNOSIS — F41.1 ANXIETY, GENERALIZED: ICD-10-CM

## 2025-05-14 DIAGNOSIS — G25.0 ESSENTIAL TREMOR: ICD-10-CM

## 2025-05-14 DIAGNOSIS — F33.1 MAJOR DEPRESSIVE DISORDER, RECURRENT, MODERATE: ICD-10-CM

## 2025-05-14 DIAGNOSIS — E11.9 TYPE 2 DIABETES MELLITUS WITHOUT COMPLICATION, WITHOUT LONG-TERM CURRENT USE OF INSULIN: ICD-10-CM

## 2025-05-14 DIAGNOSIS — E06.3 HYPOTHYROIDISM DUE TO HASHIMOTO'S THYROIDITIS: ICD-10-CM

## 2025-05-14 DIAGNOSIS — J43.2 CENTRILOBULAR EMPHYSEMA: ICD-10-CM

## 2025-05-14 DIAGNOSIS — H90.6 MIXED CONDUCTIVE AND SENSORINEURAL HEARING LOSS OF BOTH EARS: ICD-10-CM

## 2025-05-14 DIAGNOSIS — Z00.00 MEDICARE ANNUAL WELLNESS VISIT, SUBSEQUENT: Primary | ICD-10-CM

## 2025-05-14 NOTE — ASSESSMENT & PLAN NOTE
Hypertension is stable and controlled  Continue current treatment regimen.  Blood pressure will be reassessed in 6 months.    Orders:    CBC & Differential; Future    Comprehensive Metabolic Panel; Future    Hemoglobin A1c; Future    TSH+Free T4; Future    Microalbumin / Creatinine Urine Ratio - Urine, Clean Catch; Future

## 2025-05-14 NOTE — PROGRESS NOTES
Subjective   The ABCs of the Annual Wellness Visit  Medicare Wellness Visit      Sugar Lee is a 89 y.o. patient who presents for a Medicare Wellness Visit.    The following portions of the patient's history were reviewed and   updated as appropriate: allergies, current medications, past family history, past medical history, past social history, past surgical history, and problem list.    Compared to one year ago, the patient's physical   health is worse.  Compared to one year ago, the patient's mental   health is worse.    Recent Hospitalizations:  She was not admitted to the hospital during the last year.     Current Medical Providers:  Patient Care Team:  Alpesh Oliveira MD as PCP - General (Internal Medicine)    Outpatient Medications Prior to Visit   Medication Sig Dispense Refill    cilostazol (PLETAL) 50 MG tablet TAKE 2 TABLETS BY MOUTH TWICE DAILY 120 tablet 5    glucose blood (ReliOn True Metrix Test Strips) test strip 1 each by Other route Daily. Use as instructed 30 each 5    levothyroxine (SYNTHROID, LEVOTHROID) 50 MCG tablet Take 1 tablet by mouth Daily. 90 tablet 3    rosuvastatin (CRESTOR) 5 MG tablet TAKE 1 TABLET BY MOUTH EVERY NIGHT AT BEDTIME 90 tablet 1    spironolactone-hydrochlorothiazide (ALDACTAZIDE) 25-25 MG tablet Take 0.5 tablets by mouth Daily. 45 tablet 4     No facility-administered medications prior to visit.     No opioid medication identified on active medication list. I have reviewed chart for other potential  high risk medication/s and harmful drug interactions in the elderly.      Aspirin is not on active medication list.  Aspirin use is not indicated based on review of current medical condition/s. Risk of harm outweighs potential benefits.  .    Patient Active Problem List   Diagnosis    Hypertension, essential    Centrilobular emphysema    Anxiety, generalized    Major depressive disorder, recurrent, moderate    Diabetes 1.5, managed as type 2    Cerebrovascular  "accident (CVA) due to thrombosis of left middle cerebral artery    Acute neck pain    Type 2 diabetes mellitus without complication, without long-term current use of insulin    Chronic pain of right ankle    Medicare annual wellness visit, subsequent    Essential tremor    Hypothyroidism due to Hashimoto's thyroiditis    PAD (peripheral artery disease)    Need for immunization against influenza    Mixed conductive and sensorineural hearing loss of both ears     Advance Care Planning Advance Directive is not on file.  ACP discussion was held with the patient during this visit. Patient has an advance directive (not in EMR), copy requested.            Objective   Vitals:    05/14/25 1257   BP: 135/76   BP Location: Right arm   Patient Position: Sitting   Cuff Size: Adult   Pulse: 71   Temp: 98 °F (36.7 °C)   TempSrc: Skin   SpO2: 97%   Weight: 44.8 kg (98 lb 12.8 oz)   Height: 160 cm (63\")   PainSc: 0-No pain       Estimated body mass index is 17.5 kg/m² as calculated from the following:    Height as of this encounter: 160 cm (63\").    Weight as of this encounter: 44.8 kg (98 lb 12.8 oz).    BMI is below normal parameters (malnutrition). Recommendations: treating the underlying disease process           Does the patient have evidence of cognitive impairment? Yes  Lab Results   Component Value Date    TRIG 100 05/07/2025    HDL 52 05/07/2025     (H) 05/07/2025    VLDL 18 05/07/2025    HGBA1C 6.60 (H) 05/07/2025                                                                                               Health  Risk Assessment    Smoking Status:  Social History     Tobacco Use   Smoking Status Every Day    Current packs/day: 0.50    Average packs/day: 0.5 packs/day for 75.4 years (37.7 ttl pk-yrs)    Types: Cigarettes    Start date: 1/1/1950   Smokeless Tobacco Never     Alcohol Consumption:  Social History     Substance and Sexual Activity   Alcohol Use Never       Fall Risk Screen  STEADI Fall Risk Assessment " was completed, and patient is at MODERATE risk for falls. Assessment completed on:2025    Depression Screening   Little interest or pleasure in doing things? Not at all   Feeling down, depressed, or hopeless? Several days   PHQ-2 Total Score 1      Health Habits and Functional and Cognitive Screenin/14/2025    12:51 PM   Functional & Cognitive Status   Do you have difficulty preparing food and eating? No   Do you have difficulty bathing yourself, getting dressed or grooming yourself? Yes   Do you have difficulty using the toilet? No   Do you have difficulty moving around from place to place? Yes   Do you have trouble with steps or getting out of a bed or a chair? No   Current Diet Well Balanced Diet   Dental Exam Not up to date   Eye Exam Up to date   Exercise (times per week) 4 times per week   Current Exercises Include Walking   Do you need help using the phone?  Yes   Are you deaf or do you have serious difficulty hearing?  Yes   Do you need help to go to places out of walking distance? Yes   Do you need help shopping? Yes   Do you need help preparing meals?  Yes   Do you need help with housework?  No   Do you need help with laundry? Yes   Do you need help taking your medications? Yes   Do you need help managing money? Yes   Do you ever drive or ride in a car without wearing a seat belt? No   Have you felt unusual stress, anger or loneliness in the last month? No   Who do you live with? Child   If you need help, do you have trouble finding someone available to you? No   Have you been bothered in the last four weeks by sexual problems? No   Do you have difficulty concentrating, remembering or making decisions? Yes           Age-appropriate Screening Schedule:  Refer to the list below for future screening recommendations based on patient's age, sex and/or medical conditions. Orders for these recommended tests are listed in the plan section. The patient has been provided with a written plan.    Health  Maintenance List  Health Maintenance   Topic Date Due    DIABETIC FOOT EXAM  Never done    TDAP/TD VACCINES (1 - Tdap) Never done    ZOSTER VACCINE (1 of 2) Never done    RSV Vaccine - Adults (1 - 1-dose 75+ series) Never done    Pneumococcal Vaccine 50+ (2 of 2 - PCV) 11/13/2014    DXA SCAN  11/04/2015    DIABETIC EYE EXAM  10/23/2020    URINE MICROALBUMIN-CREATININE RATIO (uACR)  11/09/2024    COVID-19 Vaccine (4 - 2024-25 season) 12/31/2025 (Originally 9/1/2024)    INFLUENZA VACCINE  07/01/2025    HEMOGLOBIN A1C  11/07/2025    ANNUAL WELLNESS VISIT  05/14/2026                                                                                                                                                CMS Preventative Services Quick Reference  Risk Factors Identified During Encounter  Tobacco Use/Dependance Risk (use dotphrase .tobaccocessation for documentation)    The above risks/problems have been discussed with the patient.  Pertinent information has been shared with the patient in the After Visit Summary.  An After Visit Summary and PPPS were made available to the patient.    Follow Up:   Next Medicare Wellness visit to be scheduled in 1 year.     Assessment & Plan  Type 2 diabetes mellitus without complication, without long-term current use of insulin  Diabetes is stable.   Continue current treatment regimen.  Diabetes will be reassessed in 6 months    Orders:    CBC & Differential; Future    Comprehensive Metabolic Panel; Future    Hemoglobin A1c; Future    TSH+Free T4; Future    Microalbumin / Creatinine Urine Ratio - Urine, Clean Catch; Future    Medicare annual wellness visit, subsequent    Orders:    CBC & Differential; Future    Comprehensive Metabolic Panel; Future    Hemoglobin A1c; Future    TSH+Free T4; Future    Microalbumin / Creatinine Urine Ratio - Urine, Clean Catch; Future    PAD (peripheral artery disease)    Orders:    CBC & Differential; Future    Comprehensive Metabolic Panel; Future     Hemoglobin A1c; Future    TSH+Free T4; Future    Microalbumin / Creatinine Urine Ratio - Urine, Clean Catch; Future    Mixed conductive and sensorineural hearing loss of both ears    Orders:    CBC & Differential; Future    Comprehensive Metabolic Panel; Future    Hemoglobin A1c; Future    TSH+Free T4; Future    Microalbumin / Creatinine Urine Ratio - Urine, Clean Catch; Future    Hypothyroidism due to Hashimoto's thyroiditis    Orders:    CBC & Differential; Future    Comprehensive Metabolic Panel; Future    Hemoglobin A1c; Future    TSH+Free T4; Future    Microalbumin / Creatinine Urine Ratio - Urine, Clean Catch; Future    Anxiety, generalized  Psychological condition is stable.  Continue current treatment regimen.  Psychological condition  will be reassessed in 6 months.    Orders:    CBC & Differential; Future    Comprehensive Metabolic Panel; Future    Hemoglobin A1c; Future    TSH+Free T4; Future    Microalbumin / Creatinine Urine Ratio - Urine, Clean Catch; Future    Centrilobular emphysema  COPD is stable.    Plan:  Continue same medication/s without change.    Discussed medication dosage, use, side effects, and goals of treatment in detail.    Warning signs of respiratory distress were reviewed with the patient. .    Patient Treatment Goals:   symptom prevention, minimizing limitation in activity, prevention of exacerbations and use of ER/inpatient care, maintenance of optimal pulmonary function, and minimization of adverse effects of treatment    Followup in 6 months.    Orders:    CBC & Differential; Future    Comprehensive Metabolic Panel; Future    Hemoglobin A1c; Future    TSH+Free T4; Future    Microalbumin / Creatinine Urine Ratio - Urine, Clean Catch; Future    Essential tremor    Orders:    CBC & Differential; Future    Comprehensive Metabolic Panel; Future    Hemoglobin A1c; Future    TSH+Free T4; Future    Microalbumin / Creatinine Urine Ratio - Urine, Clean Catch; Future    Cerebrovascular  accident (CVA) due to thrombosis of left middle cerebral artery    Orders:    CBC & Differential; Future    Comprehensive Metabolic Panel; Future    Hemoglobin A1c; Future    TSH+Free T4; Future    Microalbumin / Creatinine Urine Ratio - Urine, Clean Catch; Future    Major depressive disorder, recurrent, moderate  Patient's depression is a single episode that is mild without psychosis. Depression is in full remission and stable.    Plan:   Continue current medication therapy     Followup in 6 months.     Orders:    CBC & Differential; Future    Comprehensive Metabolic Panel; Future    Hemoglobin A1c; Future    TSH+Free T4; Future    Microalbumin / Creatinine Urine Ratio - Urine, Clean Catch; Future    Hypertension, essential  Hypertension is stable and controlled  Continue current treatment regimen.  Blood pressure will be reassessed in 6 months.    Orders:    CBC & Differential; Future    Comprehensive Metabolic Panel; Future    Hemoglobin A1c; Future    TSH+Free T4; Future    Microalbumin / Creatinine Urine Ratio - Urine, Clean Catch; Future         Follow Up:   Return in about 6 months (around 11/14/2025).

## 2025-05-14 NOTE — ASSESSMENT & PLAN NOTE
Orders:    CBC & Differential; Future    Comprehensive Metabolic Panel; Future    Hemoglobin A1c; Future    TSH+Free T4; Future    Microalbumin / Creatinine Urine Ratio - Urine, Clean Catch; Future

## 2025-05-14 NOTE — PROGRESS NOTES
"Chief Complaint   Patient presents with    Medicare Wellness-subsequent     89 yo female presents for a Subsequent MWV. Pt has had labs. No new issues.    Hypertension    Emphysema        Objective   Vital Signs  Vitals:    05/14/25 1257   BP: 135/76   BP Location: Right arm   Patient Position: Sitting   Cuff Size: Adult   Pulse: 71   Temp: 98 °F (36.7 °C)   TempSrc: Skin   SpO2: 97%   Weight: 44.8 kg (98 lb 12.8 oz)   Height: 160 cm (63\")      Body mass index is 17.5 kg/m².  Review of Systems   Constitutional:  Positive for unexpected weight loss.   HENT:  Positive for hearing loss.    Eyes: Negative.    Respiratory:  Positive for shortness of breath.    Cardiovascular: Negative.    Gastrointestinal: Negative.    Endocrine: Negative.    Genitourinary: Negative.    Musculoskeletal: Negative.    Allergic/Immunologic: Negative.    Neurological:  Positive for weakness and memory problem.   Hematological: Negative.    Psychiatric/Behavioral: Negative.        Physical Exam  Constitutional:       General: She is not in acute distress.     Appearance: Normal appearance.   HENT:      Head: Normocephalic.      Mouth/Throat:      Mouth: Mucous membranes are moist.   Eyes:      Conjunctiva/sclera: Conjunctivae normal.      Pupils: Pupils are equal, round, and reactive to light.   Cardiovascular:      Rate and Rhythm: Normal rate and regular rhythm.      Pulses: Normal pulses.      Heart sounds: Normal heart sounds.   Pulmonary:      Effort: Pulmonary effort is normal.   Abdominal:      General: Abdomen is flat. Bowel sounds are normal.      Palpations: Abdomen is soft.   Musculoskeletal:         General: No swelling. Normal range of motion.      Cervical back: Neck supple.   Skin:     General: Skin is warm and dry.      Coloration: Skin is not jaundiced.   Neurological:      General: No focal deficit present.      Mental Status: She is alert and oriented to person, place, and time. Mental status is at baseline.   Psychiatric:  " "       Mood and Affect: Mood normal.         Behavior: Behavior normal.         Thought Content: Thought content normal.         Judgment: Judgment normal.     Hearing loss, somewhat cachectic appearing today from weight loss over the past 6 months    Patient has inspiratory crackles a few rhonchi at both bases, that improved with inspiration cardiac exam regular rhythm faint 1/6 systolic murmur lower extremities coolness dryness to the skin with varicosities present no edema hard of hearing, but pleasant alert mouth shows some dry mucosa  Result Review :   No results found for: \"PROBNP\", \"BNP\"  CMP          11/7/2024    10:09 5/7/2025    09:51   CMP   Glucose 140  119    BUN 25  21    Creatinine 1.05  1.02    EGFR 50.0  51.7    Sodium 138  140    Potassium 4.2  4.0    Chloride 101  99    Calcium 10.0  10.0    Total Protein 7.0  6.8    Albumin 3.6  3.7    Globulin 3.4  3.1    Total Bilirubin 0.5  0.5    Alkaline Phosphatase 82  111    AST (SGOT) 22  27    ALT (SGPT) 11  14    Albumin/Globulin Ratio 1.1  1.2    BUN/Creatinine Ratio 23.8  20.6    Anion Gap 7.8  10.8      CBC w/diff          11/7/2024    10:09 5/7/2025    09:51   CBC w/Diff   WBC 9.60  9.08    RBC 4.77  4.50    Hemoglobin 14.6  13.5    Hematocrit 43.3  41.1    MCV 90.8  91.3    MCH 30.6  30.0    MCHC 33.7  32.8    RDW 11.9  11.7    Platelets 260  266    Neutrophil Rel % 66.0  49.9    Immature Granulocyte Rel % 0.9  0.8    Lymphocyte Rel % 22.7  38.5    Monocyte Rel % 7.8  6.9    Eosinophil Rel % 1.7  3.0    Basophil Rel % 0.9  0.9       Lipid Panel          5/7/2025    09:51   Lipid Panel   Total Cholesterol 195    Triglycerides 100    HDL Cholesterol 52    VLDL Cholesterol 18    LDL Cholesterol  125    LDL/HDL Ratio 2.37       Lab Results   Component Value Date    TSH 4.140 11/07/2024    TSH 4.170 05/09/2024    TSH 3.510 11/09/2023      Lab Results   Component Value Date    FREET4 1.51 11/07/2024    FREET4 1.23 05/09/2024    FREET4 1.40 11/09/2023    "   A1C Last 3 Results          11/7/2024    10:09 5/7/2025    09:51   HGBA1C Last 3 Results   Hemoglobin A1C 6.30  6.60                        Visit Diagnoses:    ICD-10-CM ICD-9-CM   1. Medicare annual wellness visit, subsequent  Z00.00 V70.0   2. Type 2 diabetes mellitus without complication, without long-term current use of insulin  E11.9 250.00   3. PAD (peripheral artery disease)  I73.9 443.9   4. Mixed conductive and sensorineural hearing loss of both ears  H90.6 389.22   5. Hypothyroidism due to Hashimoto's thyroiditis  E06.3 245.2   6. Anxiety, generalized  F41.1 300.02   7. Centrilobular emphysema  J43.2 492.8   8. Essential tremor  G25.0 333.1   9. Cerebrovascular accident (CVA) due to thrombosis of left middle cerebral artery  I63.312 434.01   10. Major depressive disorder, recurrent, moderate  F33.1 296.32   11. Hypertension, essential  I10 401.9       Assessment and Plan   Diagnoses and all orders for this visit:    1. Medicare annual wellness visit, subsequent (Primary)  -     CBC & Differential; Future  -     Comprehensive Metabolic Panel; Future  -     Hemoglobin A1c; Future  -     TSH+Free T4; Future  -     Microalbumin / Creatinine Urine Ratio - Urine, Clean Catch; Future    2. Type 2 diabetes mellitus without complication, without long-term current use of insulin  -     CBC & Differential; Future  -     Comprehensive Metabolic Panel; Future  -     Hemoglobin A1c; Future  -     TSH+Free T4; Future  -     Microalbumin / Creatinine Urine Ratio - Urine, Clean Catch; Future    3. PAD (peripheral artery disease)  -     CBC & Differential; Future  -     Comprehensive Metabolic Panel; Future  -     Hemoglobin A1c; Future  -     TSH+Free T4; Future  -     Microalbumin / Creatinine Urine Ratio - Urine, Clean Catch; Future    4. Mixed conductive and sensorineural hearing loss of both ears  -     CBC & Differential; Future  -     Comprehensive Metabolic Panel; Future  -     Hemoglobin A1c; Future  -      TSH+Free T4; Future  -     Microalbumin / Creatinine Urine Ratio - Urine, Clean Catch; Future    5. Hypothyroidism due to Hashimoto's thyroiditis  -     CBC & Differential; Future  -     Comprehensive Metabolic Panel; Future  -     Hemoglobin A1c; Future  -     TSH+Free T4; Future  -     Microalbumin / Creatinine Urine Ratio - Urine, Clean Catch; Future    6. Anxiety, generalized  -     CBC & Differential; Future  -     Comprehensive Metabolic Panel; Future  -     Hemoglobin A1c; Future  -     TSH+Free T4; Future  -     Microalbumin / Creatinine Urine Ratio - Urine, Clean Catch; Future    7. Centrilobular emphysema  -     CBC & Differential; Future  -     Comprehensive Metabolic Panel; Future  -     Hemoglobin A1c; Future  -     TSH+Free T4; Future  -     Microalbumin / Creatinine Urine Ratio - Urine, Clean Catch; Future    8. Essential tremor  -     CBC & Differential; Future  -     Comprehensive Metabolic Panel; Future  -     Hemoglobin A1c; Future  -     TSH+Free T4; Future  -     Microalbumin / Creatinine Urine Ratio - Urine, Clean Catch; Future    9. Cerebrovascular accident (CVA) due to thrombosis of left middle cerebral artery  -     CBC & Differential; Future  -     Comprehensive Metabolic Panel; Future  -     Hemoglobin A1c; Future  -     TSH+Free T4; Future  -     Microalbumin / Creatinine Urine Ratio - Urine, Clean Catch; Future    10. Major depressive disorder, recurrent, moderate  -     CBC & Differential; Future  -     Comprehensive Metabolic Panel; Future  -     Hemoglobin A1c; Future  -     TSH+Free T4; Future  -     Microalbumin / Creatinine Urine Ratio - Urine, Clean Catch; Future    11. Hypertension, essential  -     CBC & Differential; Future  -     Comprehensive Metabolic Panel; Future  -     Hemoglobin A1c; Future  -     TSH+Free T4; Future  -     Microalbumin / Creatinine Urine Ratio - Urine, Clean Catch; Future        Medicare wellness questions completed May 14, 2025,    Weight loss failure  to thrive, discussed treatment options [facility] [email or web address], stressed, may 14 2025    Shaking a lot, she worries about it tremors, as below May 16, 2024 tried primidone but made her very sleepy and groggy, stopped,    Generalized tremors upper and lower extremities body, not consistent with Parkinson's disease, patient is still ambulatory most likely essential tremors, discussed treatment options side effects of medications November 2023, treatment options discussed as above and below, consideration for propranolol but would be cautious given her underlying COPD, primidone would be the next best option, and a 16 2024,     Claudication lower legs, ----- severe arterial insufficiency left greater than right, has followed up with vascular surgery in the past,, continues rosuvastatin,, Pletal,    Hyperlipidemia continues rosuvastatin 5 mg daily     Lower extremity edema chronic bilateral due to venous insufficiency arterial insufficiency pulmonary issues etc. May 16, 2024     Anxiety depression, continues mirtazapine, 7.5 mg nightly currently not taking as of May 2025==== encourage patient to take to help with appetite and overall anxiety depression,     Hearing deficit, uses hearing aids     Memory deficits, continues over-the-counter Prevagen, getting worse per daughter,      Left brain stroke left thalamus left internal capsule May 2014 recommended baby aspirin daily,     Hypertension continues Aldactazide 25/25 mg--- half tablet daily     COPD, discussed quitting tobacco, again May 2023     Hypothyroid --- left thyroidectomy, continues  Synthroid 0.05 mg daily,      Osteopenia remotely diagnosed,     Diabetes type 2 --- hemoglobin A1c 6.6 May 7, 2025 no medications urine microalbumin normal 1.9 November 7, 2024--- has Amaryl ordered as needed     Cervical dystonia, pain, continues Zanaflex 2 mg nightly as needed        Follow Up   Return in about 6 months (around 11/14/2025).  Patient was given instructions and  counseling regarding her condition or for health maintenance advice. Please see specific information pulled into the AVS if appropriate.

## 2025-05-14 NOTE — ASSESSMENT & PLAN NOTE
Diabetes is stable.   Continue current treatment regimen.  Diabetes will be reassessed in 6 months    Orders:    CBC & Differential; Future    Comprehensive Metabolic Panel; Future    Hemoglobin A1c; Future    TSH+Free T4; Future    Microalbumin / Creatinine Urine Ratio - Urine, Clean Catch; Future

## 2025-05-14 NOTE — ASSESSMENT & PLAN NOTE
Patient's depression is a single episode that is mild without psychosis. Depression is in full remission and stable.    Plan:   Continue current medication therapy     Followup in 6 months.     Orders:    CBC & Differential; Future    Comprehensive Metabolic Panel; Future    Hemoglobin A1c; Future    TSH+Free T4; Future    Microalbumin / Creatinine Urine Ratio - Urine, Clean Catch; Future

## 2025-05-14 NOTE — ASSESSMENT & PLAN NOTE
COPD is stable.    Plan:  Continue same medication/s without change.    Discussed medication dosage, use, side effects, and goals of treatment in detail.    Warning signs of respiratory distress were reviewed with the patient. .    Patient Treatment Goals:   symptom prevention, minimizing limitation in activity, prevention of exacerbations and use of ER/inpatient care, maintenance of optimal pulmonary function, and minimization of adverse effects of treatment    Followup in 6 months.    Orders:    CBC & Differential; Future    Comprehensive Metabolic Panel; Future    Hemoglobin A1c; Future    TSH+Free T4; Future    Microalbumin / Creatinine Urine Ratio - Urine, Clean Catch; Future

## 2025-05-14 NOTE — ASSESSMENT & PLAN NOTE
Psychological condition is stable.  Continue current treatment regimen.  Psychological condition  will be reassessed in 6 months.    Orders:    CBC & Differential; Future    Comprehensive Metabolic Panel; Future    Hemoglobin A1c; Future    TSH+Free T4; Future    Microalbumin / Creatinine Urine Ratio - Urine, Clean Catch; Future

## 2025-05-19 ENCOUNTER — TELEPHONE (OUTPATIENT)
Dept: INTERNAL MEDICINE | Age: OVER 89
End: 2025-05-19
Payer: MEDICARE

## 2025-05-19 DIAGNOSIS — G89.29 CHRONIC PAIN OF RIGHT ANKLE: ICD-10-CM

## 2025-05-19 DIAGNOSIS — M25.571 CHRONIC PAIN OF RIGHT ANKLE: ICD-10-CM

## 2025-05-19 DIAGNOSIS — M62.81 ABNORMAL GAIT DUE TO MUSCLE WEAKNESS: Primary | ICD-10-CM

## 2025-05-19 DIAGNOSIS — R26.9 ABNORMAL GAIT DUE TO MUSCLE WEAKNESS: Primary | ICD-10-CM

## 2025-06-06 DIAGNOSIS — E11.9 TYPE 2 DIABETES MELLITUS WITHOUT COMPLICATIONS: ICD-10-CM

## 2025-06-06 RX ORDER — MIRTAZAPINE 7.5 MG/1
7.5 TABLET, FILM COATED ORAL
Qty: 90 TABLET | Refills: 3 | Status: SHIPPED | OUTPATIENT
Start: 2025-06-06

## 2025-07-09 ENCOUNTER — OFFICE VISIT (OUTPATIENT)
Dept: PODIATRY | Facility: CLINIC | Age: OVER 89
End: 2025-07-09
Payer: MEDICARE

## 2025-07-09 VITALS
HEART RATE: 80 BPM | DIASTOLIC BLOOD PRESSURE: 78 MMHG | OXYGEN SATURATION: 91 % | HEIGHT: 63 IN | SYSTOLIC BLOOD PRESSURE: 152 MMHG | WEIGHT: 98.2 LBS | BODY MASS INDEX: 17.4 KG/M2

## 2025-07-09 DIAGNOSIS — I73.9 PVD (PERIPHERAL VASCULAR DISEASE): ICD-10-CM

## 2025-07-09 DIAGNOSIS — R26.2 DIFFICULTY WALKING: ICD-10-CM

## 2025-07-09 DIAGNOSIS — L60.2 ONYCHOGRYPHOSIS: ICD-10-CM

## 2025-07-09 DIAGNOSIS — E11.8 DM FEET: ICD-10-CM

## 2025-07-09 DIAGNOSIS — B35.1 ONYCHOMYCOSIS: ICD-10-CM

## 2025-07-09 DIAGNOSIS — E11.9 DIABETES MELLITUS WITHOUT COMPLICATION: Primary | ICD-10-CM

## 2025-07-09 DIAGNOSIS — L60.0 ONYCHOCRYPTOSIS: ICD-10-CM

## 2025-07-09 NOTE — PROGRESS NOTES
[facility] - PODIATRY    Today's Date: 07/09/25    Patient Name: Sugar Lee  MRN: [id number]  CSN: [id number]  PCP: Alpesh Oliveira MD, Last PCP Visit: 14 May 2025  Referring Provider: Referring, Self    SUBJECTIVE     Chief Complaint   Patient presents with    Left Foot - Nail Problem    Right Foot - Nail Problem     HPI: Sugar Lee, a 89 y.o.female, presents to clinic for painful toenail and a diabetic foot evaluation.    Patient presents with her daughter from she lives with.    New, Established, New Problem:  New  Location:  Toenails  Duration:   Greater than five years  Onset:  Gradual  Nature:  sore with palpation.  Aggravating factors:  Pain with shoe gear and ambulation.  Previous Treatment: Unable to trim their own toenails.    Patient controlling diabetes via: Utilizing insulin    Patient denies any fevers, chills, nausea, vomiting, shortness of breath, nor any other constitutional signs nor symptoms.    No other pedal complaints at this time.    Past Medical History:   Diagnosis Date    Arthritis     Bunion     Chronic kidney disease     COPD (chronic obstructive pulmonary disease)     Diabetes mellitus     Difficulty walking     Disease of thyroid gland     HL (hearing loss)     Hyperlipidemia     Hypertension     Ingrown toenail     Osteopenia     Pneumonia     Stroke     Visual impairment      Past Surgical History:   Procedure Laterality Date    EYE SURGERY      HYSTERECTOMY       History reviewed. No pertinent family history.  Social History     Socioeconomic History    Marital status:    Tobacco Use    Smoking status: Every Day     Current packs/day: 0.50     Average packs/day: 0.5 packs/day for 75.5 years (37.8 ttl pk-yrs)     Types: Cigarettes     Start date: 1[birth year]1/1950    Smokeless tobacco: Never   Vaping Use    Vaping status: Never Used   Substance and Sexual Activity    Alcohol use: Never    Drug use: Never    Sexual activity: Not Currently     Partners: Male      Allergies   Allergen Reactions    Bee Pollen Unknown - High Severity    Sulfa Antibiotics Other (See Comments)     Current Outpatient Medications   Medication Sig Dispense Refill    cilostazol (PLETAL) 50 MG tablet TAKE 2 TABLETS BY MOUTH TWICE DAILY 120 tablet 5    glucose blood (ReliOn True Metrix Test Strips) test strip 1 each by Other route Daily. Use as instructed 30 each 5    levothyroxine (SYNTHROID, LEVOTHROID) 50 MCG tablet Take 1 tablet by mouth Daily. 90 tablet 3    mirtazapine (REMERON) 7.5 MG tablet TAKE 1 TABLET BY MOUTH EVERY NIGHT AT BEDTIME 90 tablet 3    rosuvastatin (CRESTOR) 5 MG tablet TAKE 1 TABLET BY MOUTH EVERY NIGHT AT BEDTIME 90 tablet 1    spironolactone-hydrochlorothiazide (ALDACTAZIDE) 25-25 MG tablet Take 0.5 tablets by mouth Daily. 45 tablet 4     No current facility-administered medications for this visit.     OBJECTIVE     Vitals:    07/09/25 1432   BP: 152/78   Pulse: 80   SpO2: 91%       Body mass index is 17.4 kg/m².    Lab Results   Component Value Date    HGBA1C 6.60 (H) 05/07/2025       Lab Results   Component Value Date    GLUCOSE 119 (H) 05/07/2025    CALCIUM 10.0 (H) 05/07/2025     05/07/2025    K 4.0 05/07/2025    CO2 30.2 (H) 05/07/2025    CL 99 05/07/2025    BUN 21 05/07/2025    CREATININE 1.02 (H) 05/07/2025    EGFRIFNONA 64 11/09/2021    BCR 20.6 05/07/2025    ANIONGAP 10.8 05/07/2025       Patient seen in no apparent distress.      PHYSICAL EXAM:     Foot/Ankle Exam    GENERAL  Diabetic foot exam performed    Appearance:  appears stated age, elderly and chronically ill  Orientation:  AAOx3  Affect:  appropriate  Gait:  antalgic  Assistance:  walker  Right shoe gear: casual shoe  Left shoe gear: casual shoe    VASCULAR     Right Foot Vascularity   Dorsalis pedis:  Absent  Posterior tibial:  Absent  Skin temperature:  cold  Edema grading:  None  CFT:  3  Pedal hair growth:  Absent  Varicosities:  moderate varicosities     Left Foot Vascularity   Dorsalis pedis:   Absent  Posterior tibial:  Absent  Skin temperature:  cold  Edema grading:  None  CFT:  3  Pedal hair growth:  Absent  Varicosities:  moderate varicosities     NEUROLOGIC     Right Foot Neurologic   Normal sensation    Light touch sensation: normal  Vibratory sensation: normal  Hot/Cold sensation: normal  Protective Sensation using Theriot-Telly Monofilament:   Sites intact: 10  Sites tested: 10     Left Foot Neurologic   Normal sensation    Light touch sensation: normal  Vibratory sensation: normal  Hot/Cold sensation:  normal  Protective Sensation using Theriot-Telly Monofilament:   Sites intact: 10  Sites tested: 10    MUSCLE STRENGTH     Right Foot Muscle Strength   Foot dorsiflexion:  4-  Foot plantar flexion:  4-  Foot inversion:  4-  Foot eversion:  4-     Left Foot Muscle Strength   Foot dorsiflexion:  4-  Foot plantar flexion:  4-  Foot inversion:  4-  Foot eversion:  4-    RANGE OF MOTION     Right Foot Range of Motion   Foot and ankle ROM within normal limits       Left Foot Range of Motion   Foot and ankle ROM within normal limits      DERMATOLOGIC      Right Foot Dermatologic   Skin  Right foot skin is intact.   Nails  1.  Positive for elongated, onychomycosis, abnormal thickness, subungual debris, dystrophic nail and ingrown toenail.  2.  Positive for elongated, onychomycosis, abnormal thickness, subungual debris, dystrophic nail and ingrown toenail.  3.  Positive for elongated, onychomycosis, abnormal thickness, subungual debris, dystrophic nail and ingrown toenail.  5.  Positive for elongated, onychomycosis, abnormal thickness, subungual debris, dystrophic nail and ingrown toenail.  Nails comment:  Toenails 1, 2, 3, 4, and 5     Left Foot Dermatologic   Skin  Left foot skin is intact.   Nails comment:  Toenails 1, 2, 3, 4, and 5  Nails  1.  Positive for elongated, onychomycosis, abnormal thickness, subungual debris, dystrophic nail and ingrown toenail.  2.  Positive for elongated,  onychomycosis, abnormal thickness, subungual debris, dystrophic nail and ingrown toenail.  3.  Positive for elongated, onychomycosis, abnormal thickness, subungual debris, dystrophic nail and ingrown toenail.  4.  Positive for elongated, onychomycosis, abnormally thick, subungual debris, dystrophic nail and ingrown toenail.  5.  Positive for elongated, onychomycosis, abnormally thick, subungual debris, dystrophic nail and ingrown toenail.    Diabetic Foot Exam Performed and Monofilament Test Performed    ASSESSMENT/PLAN     Diagnoses and all orders for this visit:    1. Diabetes mellitus without complication (Primary)    2. DM feet    3. PVD (peripheral vascular disease)    4. Onychocryptosis    5. Onychogryphosis    6. Onychomycosis    7. Difficulty walking        Comprehensive lower extremity examination and evaluation was performed.    Discussed findings and treatment plan including risks, benefits, and treatment options with patient in detail. Patient agreed with treatment plan.    Medications and allergies reviewed.  Reviewed available blood glucose and HgB A1C lab values along with other pertinent labs.  These were discussed with the patient as to their importance of diabetic maintenance.    Toenails 1, 2, 3, 5 on Right and 1, 2, 3, 4, 5 on Left were debrided with nail nippers then filed with a Dremel nail yoli.  Patient tolerated procedure well without complications.    Diabetic foot exam performed and documented this date, compliant with CQM required standards. Detail of findings as noted in physical exam.  Lower extremity Neurologic exam for diabetic patient performed and documented this date, compliant with PQRS required standards. Detail of findings as noted in physical exam.  Advised patient importance of good routine lower extremity hygiene. Advised patient importance of evaluating for intact skin and pain free nail borders.  Advised patient to use mirror to evaluate plantar/ soles of feet for better  visualization. Advised patient monitor and phone office to be seen if any cracking to skin, open lesions, painful nail borders or if nails become elongated prior to next visit. Advised patient importance of daily cleansing of lower extremities, followed by good skin cream to maintain normal hydration of skin. Also advised patient importance of close daily monitoring of blood sugar. Advised to regulate diet and medications to maintain control of blood sugar in optimal range. Contact primary care provider if difficulties maintaining blood sugar levels.  Advised Patient of presence of Diabetes Mellitus condition.  Advised Patient risk of progression and worsening or improvement, then return of condition.  Will monitor condition for any change in future. Treat with most appropriate treatment pending status of condition.  Counseled and advised patient extensively on nature and ramifications of diabetes. Standard instructions given to patient for good diabetic foot care and maintenance. Advised importance of careful monitoring to avoid break down and complications secondary to diabetes. Advised patient importance of strict maintenance of blood sugar control. Advised patient of possible ominous results from neglect of condition, i.e.: amputation/ loss of digits, feet and legs, or even death.  Patient states understands counseling, will monitor closely, continue good hygiene and routine diabetic foot care. Patient will contact office should they have any questions or problems.      An After Visit Summary was printed and given to the patient at discharge, including (if requested) any available informative/educational handouts regarding diagnosis, treatment, or medications. All questions were answered to patient/family satisfaction. Should symptoms fail to improve or worsen they agree to call or return to clinic or to go to the Emergency Department. Discussed the importance of following up with any needed screening  tests/labs/specialist appointments and any requested follow-up recommended by me today. Importance of maintaining follow-up discussed and patient accepts that missed appointments can delay diagnosis and potentially lead to worsening of conditions.    Return in about 9 weeks (around 9/10/2025) for Toenail Care., or sooner if acute issues arise.    This document has been electronically signed by Gregor Buckner DPM on July 9, 2025 14:53 EDT